# Patient Record
Sex: FEMALE | Race: WHITE | Employment: OTHER | URBAN - METROPOLITAN AREA
[De-identification: names, ages, dates, MRNs, and addresses within clinical notes are randomized per-mention and may not be internally consistent; named-entity substitution may affect disease eponyms.]

---

## 2019-03-13 ENCOUNTER — APPOINTMENT (OUTPATIENT)
Dept: CT IMAGING | Age: 84
End: 2019-03-13
Payer: MEDICARE

## 2019-03-13 ENCOUNTER — HOSPITAL ENCOUNTER (OUTPATIENT)
Age: 84
Setting detail: OBSERVATION
Discharge: HOME HEALTH | End: 2019-03-14
Attending: EMERGENCY MEDICINE | Admitting: INTERNAL MEDICINE
Payer: MEDICARE

## 2019-03-13 DIAGNOSIS — I63.9 CEREBROVASCULAR ACCIDENT (CVA), UNSPECIFIED MECHANISM (HCC): Primary | ICD-10-CM

## 2019-03-13 PROBLEM — G45.9 TIA (TRANSIENT ISCHEMIC ATTACK): Status: ACTIVE | Noted: 2019-03-13

## 2019-03-13 LAB
ANION GAP SERPL CALCULATED.3IONS-SCNC: 11 MMOL/L (ref 3–16)
BASOPHILS ABSOLUTE: 0 K/UL (ref 0–0.2)
BASOPHILS RELATIVE PERCENT: 0.5 %
BUN BLDV-MCNC: 13 MG/DL (ref 7–20)
CALCIUM SERPL-MCNC: 9.1 MG/DL (ref 8.3–10.6)
CHLORIDE BLD-SCNC: 103 MMOL/L (ref 99–110)
CO2: 26 MMOL/L (ref 21–32)
CREAT SERPL-MCNC: 0.7 MG/DL (ref 0.6–1.2)
EOSINOPHILS ABSOLUTE: 0.1 K/UL (ref 0–0.6)
EOSINOPHILS RELATIVE PERCENT: 1.1 %
GFR AFRICAN AMERICAN: >60
GFR NON-AFRICAN AMERICAN: >60
GLUCOSE BLD-MCNC: 106 MG/DL (ref 70–99)
GLUCOSE BLD-MCNC: 113 MG/DL (ref 70–99)
HCT VFR BLD CALC: 39.8 % (ref 36–48)
HEMOGLOBIN: 13.4 G/DL (ref 12–16)
INR BLD: 2.36 (ref 0.86–1.14)
LYMPHOCYTES ABSOLUTE: 1.9 K/UL (ref 1–5.1)
LYMPHOCYTES RELATIVE PERCENT: 35.4 %
MCH RBC QN AUTO: 30.9 PG (ref 26–34)
MCHC RBC AUTO-ENTMCNC: 33.6 G/DL (ref 31–36)
MCV RBC AUTO: 92.1 FL (ref 80–100)
MONOCYTES ABSOLUTE: 0.5 K/UL (ref 0–1.3)
MONOCYTES RELATIVE PERCENT: 9.1 %
NEUTROPHILS ABSOLUTE: 3 K/UL (ref 1.7–7.7)
NEUTROPHILS RELATIVE PERCENT: 53.9 %
PDW BLD-RTO: 14.3 % (ref 12.4–15.4)
PERFORMED ON: ABNORMAL
PLATELET # BLD: 233 K/UL (ref 135–450)
PMV BLD AUTO: 7.1 FL (ref 5–10.5)
POTASSIUM SERPL-SCNC: 3.5 MMOL/L (ref 3.5–5.1)
PROTHROMBIN TIME: 26.9 SEC (ref 9.8–13)
RBC # BLD: 4.32 M/UL (ref 4–5.2)
SODIUM BLD-SCNC: 140 MMOL/L (ref 136–145)
TROPONIN: <0.01 NG/ML
WBC # BLD: 5.5 K/UL (ref 4–11)

## 2019-03-13 PROCEDURE — 6370000000 HC RX 637 (ALT 250 FOR IP): Performed by: INTERNAL MEDICINE

## 2019-03-13 PROCEDURE — 36415 COLL VENOUS BLD VENIPUNCTURE: CPT

## 2019-03-13 PROCEDURE — 99285 EMERGENCY DEPT VISIT HI MDM: CPT

## 2019-03-13 PROCEDURE — 85610 PROTHROMBIN TIME: CPT

## 2019-03-13 PROCEDURE — 84484 ASSAY OF TROPONIN QUANT: CPT

## 2019-03-13 PROCEDURE — 70496 CT ANGIOGRAPHY HEAD: CPT

## 2019-03-13 PROCEDURE — 93005 ELECTROCARDIOGRAM TRACING: CPT | Performed by: EMERGENCY MEDICINE

## 2019-03-13 PROCEDURE — 93010 ELECTROCARDIOGRAM REPORT: CPT | Performed by: INTERNAL MEDICINE

## 2019-03-13 PROCEDURE — 6360000004 HC RX CONTRAST MEDICATION: Performed by: EMERGENCY MEDICINE

## 2019-03-13 PROCEDURE — 85025 COMPLETE CBC W/AUTO DIFF WBC: CPT

## 2019-03-13 PROCEDURE — 2060000000 HC ICU INTERMEDIATE R&B

## 2019-03-13 PROCEDURE — 80048 BASIC METABOLIC PNL TOTAL CA: CPT

## 2019-03-13 PROCEDURE — 70450 CT HEAD/BRAIN W/O DYE: CPT

## 2019-03-13 PROCEDURE — 70498 CT ANGIOGRAPHY NECK: CPT

## 2019-03-13 PROCEDURE — 2580000003 HC RX 258: Performed by: INTERNAL MEDICINE

## 2019-03-13 RX ORDER — MULTIVIT-MIN/IRON/FOLIC ACID/K 18-600-40
2000 CAPSULE ORAL DAILY
COMMUNITY

## 2019-03-13 RX ORDER — LABETALOL HYDROCHLORIDE 5 MG/ML
10 INJECTION, SOLUTION INTRAVENOUS EVERY 10 MIN PRN
Status: DISCONTINUED | OUTPATIENT
Start: 2019-03-13 | End: 2019-03-14 | Stop reason: HOSPADM

## 2019-03-13 RX ORDER — WARFARIN SODIUM 7.5 MG/1
7.5 TABLET ORAL EVERY EVENING
Status: DISCONTINUED | OUTPATIENT
Start: 2019-03-13 | End: 2019-03-13

## 2019-03-13 RX ORDER — HYDROCHLOROTHIAZIDE 25 MG/1
25 TABLET ORAL DAILY
COMMUNITY

## 2019-03-13 RX ORDER — ASPIRIN 81 MG/1
81 TABLET ORAL DAILY
Status: DISCONTINUED | OUTPATIENT
Start: 2019-03-14 | End: 2019-03-14 | Stop reason: HOSPADM

## 2019-03-13 RX ORDER — DILTIAZEM HYDROCHLORIDE 120 MG/1
120 CAPSULE, EXTENDED RELEASE ORAL DAILY
COMMUNITY
Start: 2019-02-13

## 2019-03-13 RX ORDER — DILTIAZEM HYDROCHLORIDE 120 MG/1
120 CAPSULE, COATED, EXTENDED RELEASE ORAL NIGHTLY
Status: DISCONTINUED | OUTPATIENT
Start: 2019-03-14 | End: 2019-03-14 | Stop reason: HOSPADM

## 2019-03-13 RX ORDER — WARFARIN SODIUM 5 MG/1
7.5 TABLET ORAL EVERY EVENING
COMMUNITY
End: 2021-01-01

## 2019-03-13 RX ORDER — WARFARIN SODIUM 7.5 MG/1
7.5 TABLET ORAL
Status: COMPLETED | OUTPATIENT
Start: 2019-03-13 | End: 2019-03-13

## 2019-03-13 RX ORDER — LISINOPRIL 20 MG/1
20 TABLET ORAL DAILY
Status: DISCONTINUED | OUTPATIENT
Start: 2019-03-14 | End: 2019-03-14 | Stop reason: HOSPADM

## 2019-03-13 RX ORDER — DILTIAZEM HYDROCHLORIDE 120 MG/1
120 CAPSULE, COATED, EXTENDED RELEASE ORAL DAILY
Status: DISCONTINUED | OUTPATIENT
Start: 2019-03-14 | End: 2019-03-13

## 2019-03-13 RX ORDER — ACETAMINOPHEN 325 MG/1
650 TABLET ORAL EVERY 4 HOURS PRN
Status: DISCONTINUED | OUTPATIENT
Start: 2019-03-13 | End: 2019-03-14 | Stop reason: HOSPADM

## 2019-03-13 RX ORDER — SODIUM CHLORIDE 0.9 % (FLUSH) 0.9 %
10 SYRINGE (ML) INJECTION EVERY 12 HOURS SCHEDULED
Status: DISCONTINUED | OUTPATIENT
Start: 2019-03-13 | End: 2019-03-14 | Stop reason: HOSPADM

## 2019-03-13 RX ORDER — SODIUM CHLORIDE 0.9 % (FLUSH) 0.9 %
10 SYRINGE (ML) INJECTION PRN
Status: DISCONTINUED | OUTPATIENT
Start: 2019-03-13 | End: 2019-03-14 | Stop reason: HOSPADM

## 2019-03-13 RX ORDER — ONDANSETRON 2 MG/ML
4 INJECTION INTRAMUSCULAR; INTRAVENOUS EVERY 6 HOURS PRN
Status: DISCONTINUED | OUTPATIENT
Start: 2019-03-13 | End: 2019-03-14 | Stop reason: HOSPADM

## 2019-03-13 RX ORDER — SIMVASTATIN 20 MG
20 TABLET ORAL NIGHTLY
Status: DISCONTINUED | OUTPATIENT
Start: 2019-03-13 | End: 2019-03-14 | Stop reason: HOSPADM

## 2019-03-13 RX ORDER — LISINOPRIL 20 MG/1
20 TABLET ORAL DAILY
Status: ON HOLD | COMMUNITY
Start: 2019-02-01 | End: 2019-03-14 | Stop reason: SDUPTHER

## 2019-03-13 RX ADMIN — ACETAMINOPHEN 650 MG: 325 TABLET, FILM COATED ORAL at 23:08

## 2019-03-13 RX ADMIN — WARFARIN SODIUM 7.5 MG: 7.5 TABLET ORAL at 22:53

## 2019-03-13 RX ADMIN — DILTIAZEM HYDROCHLORIDE 120 MG: 120 CAPSULE, COATED, EXTENDED RELEASE ORAL at 23:47

## 2019-03-13 RX ADMIN — IOPAMIDOL 75 ML: 755 INJECTION, SOLUTION INTRAVENOUS at 12:50

## 2019-03-13 RX ADMIN — Medication 10 ML: at 22:55

## 2019-03-13 ASSESSMENT — PAIN DESCRIPTION - PAIN TYPE: TYPE: CHRONIC PAIN

## 2019-03-13 ASSESSMENT — PAIN SCALES - GENERAL
PAINLEVEL_OUTOF10: 3
PAINLEVEL_OUTOF10: 0
PAINLEVEL_OUTOF10: 0

## 2019-03-13 ASSESSMENT — PAIN DESCRIPTION - LOCATION: LOCATION: BACK

## 2019-03-14 ENCOUNTER — APPOINTMENT (OUTPATIENT)
Dept: MRI IMAGING | Age: 84
End: 2019-03-14
Payer: MEDICARE

## 2019-03-14 VITALS
DIASTOLIC BLOOD PRESSURE: 83 MMHG | HEART RATE: 61 BPM | TEMPERATURE: 98 F | HEIGHT: 63 IN | OXYGEN SATURATION: 95 % | WEIGHT: 176.81 LBS | RESPIRATION RATE: 18 BRPM | SYSTOLIC BLOOD PRESSURE: 162 MMHG | BODY MASS INDEX: 31.33 KG/M2

## 2019-03-14 LAB
CHOLESTEROL, TOTAL: 180 MG/DL (ref 0–199)
HDLC SERPL-MCNC: 48 MG/DL (ref 40–60)
INR BLD: 1.98 (ref 0.86–1.14)
LDL CHOLESTEROL CALCULATED: 108 MG/DL
PROTHROMBIN TIME: 22.6 SEC (ref 9.8–13)
TRIGL SERPL-MCNC: 119 MG/DL (ref 0–150)
VLDLC SERPL CALC-MCNC: 24 MG/DL

## 2019-03-14 PROCEDURE — G0378 HOSPITAL OBSERVATION PER HR: HCPCS

## 2019-03-14 PROCEDURE — 85610 PROTHROMBIN TIME: CPT

## 2019-03-14 PROCEDURE — 97535 SELF CARE MNGMENT TRAINING: CPT

## 2019-03-14 PROCEDURE — 2580000003 HC RX 258: Performed by: INTERNAL MEDICINE

## 2019-03-14 PROCEDURE — 80061 LIPID PANEL: CPT

## 2019-03-14 PROCEDURE — 6370000000 HC RX 637 (ALT 250 FOR IP): Performed by: INTERNAL MEDICINE

## 2019-03-14 PROCEDURE — 97530 THERAPEUTIC ACTIVITIES: CPT

## 2019-03-14 PROCEDURE — 36415 COLL VENOUS BLD VENIPUNCTURE: CPT

## 2019-03-14 PROCEDURE — 97161 PT EVAL LOW COMPLEX 20 MIN: CPT

## 2019-03-14 PROCEDURE — 97166 OT EVAL MOD COMPLEX 45 MIN: CPT

## 2019-03-14 PROCEDURE — 70551 MRI BRAIN STEM W/O DYE: CPT

## 2019-03-14 PROCEDURE — 94760 N-INVAS EAR/PLS OXIMETRY 1: CPT

## 2019-03-14 RX ORDER — LISINOPRIL 20 MG/1
40 TABLET ORAL DAILY
Qty: 60 TABLET | Refills: 3 | Status: SHIPPED | OUTPATIENT
Start: 2019-03-14

## 2019-03-14 RX ORDER — SIMVASTATIN 20 MG
20 TABLET ORAL NIGHTLY
Qty: 30 TABLET | Refills: 3 | Status: SHIPPED | OUTPATIENT
Start: 2019-03-14 | End: 2021-01-01

## 2019-03-14 RX ORDER — WARFARIN SODIUM 7.5 MG/1
7.5 TABLET ORAL
Status: DISCONTINUED | OUTPATIENT
Start: 2019-03-14 | End: 2019-03-14 | Stop reason: HOSPADM

## 2019-03-14 RX ADMIN — LISINOPRIL 20 MG: 20 TABLET ORAL at 09:01

## 2019-03-14 RX ADMIN — Medication 10 ML: at 09:01

## 2019-03-14 RX ADMIN — ASPIRIN 81 MG: 81 TABLET, COATED ORAL at 09:01

## 2019-03-14 ASSESSMENT — PAIN SCALES - GENERAL
PAINLEVEL_OUTOF10: 0
PAINLEVEL_OUTOF10: 0

## 2019-03-15 LAB
EKG ATRIAL RATE: 71 BPM
EKG DIAGNOSIS: NORMAL
EKG P AXIS: 41 DEGREES
EKG P-R INTERVAL: 190 MS
EKG Q-T INTERVAL: 396 MS
EKG QRS DURATION: 82 MS
EKG QTC CALCULATION (BAZETT): 430 MS
EKG R AXIS: 6 DEGREES
EKG T AXIS: 78 DEGREES
EKG VENTRICULAR RATE: 71 BPM

## 2021-01-01 ENCOUNTER — APPOINTMENT (OUTPATIENT)
Dept: GENERAL RADIOLOGY | Age: 86
DRG: 853 | End: 2021-01-01
Payer: MEDICARE

## 2021-01-01 ENCOUNTER — HOSPITAL ENCOUNTER (INPATIENT)
Age: 86
LOS: 5 days | DRG: 853 | End: 2021-08-16
Attending: EMERGENCY MEDICINE | Admitting: INTERNAL MEDICINE
Payer: MEDICARE

## 2021-01-01 ENCOUNTER — APPOINTMENT (OUTPATIENT)
Dept: CT IMAGING | Age: 86
DRG: 853 | End: 2021-01-01
Payer: MEDICARE

## 2021-01-01 VITALS
SYSTOLIC BLOOD PRESSURE: 129 MMHG | RESPIRATION RATE: 34 BRPM | HEART RATE: 103 BPM | HEIGHT: 63 IN | TEMPERATURE: 98.8 F | BODY MASS INDEX: 29.77 KG/M2 | OXYGEN SATURATION: 85 % | DIASTOLIC BLOOD PRESSURE: 48 MMHG | WEIGHT: 167.99 LBS

## 2021-01-01 DIAGNOSIS — J18.9 COMMUNITY ACQUIRED BILATERAL LOWER LOBE PNEUMONIA: ICD-10-CM

## 2021-01-01 DIAGNOSIS — J96.01 ACUTE RESPIRATORY FAILURE WITH HYPOXIA (HCC): ICD-10-CM

## 2021-01-01 DIAGNOSIS — J93.0 TENSION PNEUMOTHORAX: Primary | ICD-10-CM

## 2021-01-01 DIAGNOSIS — J15.9 COMMUNITY ACQUIRED BACTERIAL PNEUMONIA: ICD-10-CM

## 2021-01-01 DIAGNOSIS — R57.9 SHOCK (HCC): ICD-10-CM

## 2021-01-01 DIAGNOSIS — N17.9 AKI (ACUTE KIDNEY INJURY) (HCC): ICD-10-CM

## 2021-01-01 LAB
(1,3)-BETA-D-GLUCAN (FUNGITELL) INTERPRETATION: POSITIVE
(1,3)-BETA-D-GLUCAN (FUNGITELL): 293 PG/ML
A/G RATIO: 0.7 (ref 1.1–2.2)
ABO/RH: NORMAL
ALBUMIN SERPL-MCNC: 2.6 G/DL (ref 3.4–5)
ALP BLD-CCNC: 106 U/L (ref 40–129)
ALT SERPL-CCNC: 14 U/L (ref 10–40)
ANION GAP SERPL CALCULATED.3IONS-SCNC: 10 MMOL/L (ref 3–16)
ANION GAP SERPL CALCULATED.3IONS-SCNC: 11 MMOL/L (ref 3–16)
ANION GAP SERPL CALCULATED.3IONS-SCNC: 11 MMOL/L (ref 3–16)
ANION GAP SERPL CALCULATED.3IONS-SCNC: 13 MMOL/L (ref 3–16)
ANION GAP SERPL CALCULATED.3IONS-SCNC: 15 MMOL/L (ref 3–16)
ANION GAP SERPL CALCULATED.3IONS-SCNC: 16 MMOL/L (ref 3–16)
ANION GAP SERPL CALCULATED.3IONS-SCNC: 9 MMOL/L (ref 3–16)
ANTI-NUCLEAR ANTIBODY (ANA): NEGATIVE
ANTIBODY SCREEN: NORMAL
APTT: 28 SEC (ref 26.2–38.6)
AST SERPL-CCNC: 20 U/L (ref 15–37)
BASE EXCESS ARTERIAL: 1.3 MMOL/L (ref -3–3)
BASE EXCESS ARTERIAL: 4.9 MMOL/L (ref -3–3)
BASE EXCESS ARTERIAL: 5.7 MMOL/L (ref -3–3)
BASOPHILS ABSOLUTE: 0 K/UL (ref 0–0.2)
BASOPHILS RELATIVE PERCENT: 0.1 %
BASOPHILS RELATIVE PERCENT: 0.2 %
BILIRUB SERPL-MCNC: 0.5 MG/DL (ref 0–1)
BLOOD BANK DISPENSE STATUS: NORMAL
BLOOD BANK PRODUCT CODE: NORMAL
BLOOD CULTURE, ROUTINE: NORMAL
BPU ID: NORMAL
BUN BLDV-MCNC: 13 MG/DL (ref 7–20)
BUN BLDV-MCNC: 17 MG/DL (ref 7–20)
BUN BLDV-MCNC: 18 MG/DL (ref 7–20)
BUN BLDV-MCNC: 23 MG/DL (ref 7–20)
BUN BLDV-MCNC: 28 MG/DL (ref 7–20)
BUN BLDV-MCNC: 43 MG/DL (ref 7–20)
BUN BLDV-MCNC: 53 MG/DL (ref 7–20)
C-REACTIVE PROTEIN: 213.9 MG/L (ref 0–5.1)
C-REACTIVE PROTEIN: 435 MG/L (ref 0–5.1)
CALCIUM SERPL-MCNC: 8.5 MG/DL (ref 8.3–10.6)
CALCIUM SERPL-MCNC: 8.7 MG/DL (ref 8.3–10.6)
CALCIUM SERPL-MCNC: 8.7 MG/DL (ref 8.3–10.6)
CALCIUM SERPL-MCNC: 8.9 MG/DL (ref 8.3–10.6)
CALCIUM SERPL-MCNC: 8.9 MG/DL (ref 8.3–10.6)
CALCIUM SERPL-MCNC: 9 MG/DL (ref 8.3–10.6)
CALCIUM SERPL-MCNC: 9 MG/DL (ref 8.3–10.6)
CARBOXYHEMOGLOBIN ARTERIAL: 1.2 % (ref 0–1.5)
CARBOXYHEMOGLOBIN ARTERIAL: 1.3 % (ref 0–1.5)
CARBOXYHEMOGLOBIN ARTERIAL: <1 % (ref 0–1.5)
CHLORIDE BLD-SCNC: 100 MMOL/L (ref 99–110)
CHLORIDE BLD-SCNC: 92 MMOL/L (ref 99–110)
CHLORIDE BLD-SCNC: 95 MMOL/L (ref 99–110)
CHLORIDE BLD-SCNC: 96 MMOL/L (ref 99–110)
CHLORIDE BLD-SCNC: 96 MMOL/L (ref 99–110)
CHLORIDE BLD-SCNC: 97 MMOL/L (ref 99–110)
CHLORIDE BLD-SCNC: 97 MMOL/L (ref 99–110)
CO2: 24 MMOL/L (ref 21–32)
CO2: 24 MMOL/L (ref 21–32)
CO2: 25 MMOL/L (ref 21–32)
CO2: 28 MMOL/L (ref 21–32)
CO2: 29 MMOL/L (ref 21–32)
CREAT SERPL-MCNC: 1 MG/DL (ref 0.6–1.2)
CREAT SERPL-MCNC: 1.1 MG/DL (ref 0.6–1.2)
CREAT SERPL-MCNC: 1.2 MG/DL (ref 0.6–1.2)
CREAT SERPL-MCNC: 1.3 MG/DL (ref 0.6–1.2)
CREAT SERPL-MCNC: 1.3 MG/DL (ref 0.6–1.2)
CREAT SERPL-MCNC: 1.6 MG/DL (ref 0.6–1.2)
CREAT SERPL-MCNC: 1.7 MG/DL (ref 0.6–1.2)
CULTURE, BLOOD 2: NORMAL
CYCLIC CITRULLINATED PEPTIDE ANTIBODY IGG: <0.5 U/ML (ref 0–2.9)
DESCRIPTION BLOOD BANK: NORMAL
EKG ATRIAL RATE: 80 BPM
EKG ATRIAL RATE: 90 BPM
EKG DIAGNOSIS: NORMAL
EKG DIAGNOSIS: NORMAL
EKG P AXIS: 40 DEGREES
EKG P-R INTERVAL: 194 MS
EKG Q-T INTERVAL: 330 MS
EKG Q-T INTERVAL: 390 MS
EKG QRS DURATION: 102 MS
EKG QRS DURATION: 88 MS
EKG QTC CALCULATION (BAZETT): 394 MS
EKG QTC CALCULATION (BAZETT): 449 MS
EKG R AXIS: 2 DEGREES
EKG R AXIS: 7 DEGREES
EKG T AXIS: 73 DEGREES
EKG T AXIS: 87 DEGREES
EKG VENTRICULAR RATE: 80 BPM
EKG VENTRICULAR RATE: 86 BPM
EOSINOPHILS ABSOLUTE: 0 K/UL (ref 0–0.6)
EOSINOPHILS RELATIVE PERCENT: 0 %
EOSINOPHILS RELATIVE PERCENT: 0.1 %
EOSINOPHILS RELATIVE PERCENT: 0.2 %
EOSINOPHILS RELATIVE PERCENT: 0.3 %
GFR AFRICAN AMERICAN: 34
GFR AFRICAN AMERICAN: 37
GFR AFRICAN AMERICAN: 47
GFR AFRICAN AMERICAN: 47
GFR AFRICAN AMERICAN: 51
GFR AFRICAN AMERICAN: 57
GFR AFRICAN AMERICAN: >60
GFR NON-AFRICAN AMERICAN: 28
GFR NON-AFRICAN AMERICAN: 30
GFR NON-AFRICAN AMERICAN: 39
GFR NON-AFRICAN AMERICAN: 39
GFR NON-AFRICAN AMERICAN: 42
GFR NON-AFRICAN AMERICAN: 47
GFR NON-AFRICAN AMERICAN: 52
GLOBULIN: 3.8 G/DL
GLUCOSE BLD-MCNC: 119 MG/DL (ref 70–99)
GLUCOSE BLD-MCNC: 125 MG/DL (ref 70–99)
GLUCOSE BLD-MCNC: 151 MG/DL (ref 70–99)
GLUCOSE BLD-MCNC: 156 MG/DL (ref 70–99)
GLUCOSE BLD-MCNC: 157 MG/DL (ref 70–99)
GLUCOSE BLD-MCNC: 180 MG/DL (ref 70–99)
GLUCOSE BLD-MCNC: 286 MG/DL (ref 70–99)
HCO3 ARTERIAL: 27 MMOL/L (ref 21–29)
HCO3 ARTERIAL: 29.5 MMOL/L (ref 21–29)
HCO3 ARTERIAL: 29.8 MMOL/L (ref 21–29)
HCT VFR BLD CALC: 29.5 % (ref 36–48)
HCT VFR BLD CALC: 34.6 % (ref 36–48)
HCT VFR BLD CALC: 34.8 % (ref 36–48)
HCT VFR BLD CALC: 35.1 % (ref 36–48)
HCT VFR BLD CALC: 35.4 % (ref 36–48)
HCT VFR BLD CALC: 36.2 % (ref 36–48)
HCT VFR BLD CALC: 36.5 % (ref 36–48)
HEMOGLOBIN, ART, EXTENDED: 10.5 G/DL (ref 12–16)
HEMOGLOBIN, ART, EXTENDED: 10.8 G/DL (ref 12–16)
HEMOGLOBIN, ART, EXTENDED: 12.1 G/DL (ref 12–16)
HEMOGLOBIN: 11.7 G/DL (ref 12–16)
HEMOGLOBIN: 11.8 G/DL (ref 12–16)
HEMOGLOBIN: 11.9 G/DL (ref 12–16)
HEMOGLOBIN: 12 G/DL (ref 12–16)
HEMOGLOBIN: 12.2 G/DL (ref 12–16)
HEMOGLOBIN: 12.4 G/DL (ref 12–16)
HEMOGLOBIN: 9.9 G/DL (ref 12–16)
INR BLD: 1.93 (ref 0.88–1.12)
INR BLD: 2.12 (ref 0.88–1.12)
INR BLD: 2.15 (ref 0.88–1.12)
INR BLD: 2.18 (ref 0.88–1.12)
INR BLD: 2.28 (ref 0.88–1.12)
L. PNEUMOPHILA SEROGP 1 UR AG: NORMAL
LACTIC ACID, SEPSIS: 1 MMOL/L (ref 0.4–1.9)
LACTIC ACID, SEPSIS: 1.2 MMOL/L (ref 0.4–1.9)
LACTIC ACID, SEPSIS: 1.6 MMOL/L (ref 0.4–1.9)
LACTIC ACID, SEPSIS: 2.8 MMOL/L (ref 0.4–1.9)
LACTIC ACID: 1.9 MMOL/L (ref 0.4–2)
LV EF: 63 %
LVEF MODALITY: NORMAL
LYMPHOCYTES ABSOLUTE: 0.2 K/UL (ref 1–5.1)
LYMPHOCYTES ABSOLUTE: 0.5 K/UL (ref 1–5.1)
LYMPHOCYTES ABSOLUTE: 0.5 K/UL (ref 1–5.1)
LYMPHOCYTES ABSOLUTE: 0.6 K/UL (ref 1–5.1)
LYMPHOCYTES ABSOLUTE: 0.7 K/UL (ref 1–5.1)
LYMPHOCYTES ABSOLUTE: 0.9 K/UL (ref 1–5.1)
LYMPHOCYTES ABSOLUTE: 1.4 K/UL (ref 1–5.1)
LYMPHOCYTES RELATIVE PERCENT: 1 %
LYMPHOCYTES RELATIVE PERCENT: 11.1 %
LYMPHOCYTES RELATIVE PERCENT: 2.9 %
LYMPHOCYTES RELATIVE PERCENT: 3.1 %
LYMPHOCYTES RELATIVE PERCENT: 3.3 %
LYMPHOCYTES RELATIVE PERCENT: 4.1 %
LYMPHOCYTES RELATIVE PERCENT: 6.7 %
MAGNESIUM: 2 MG/DL (ref 1.8–2.4)
MAGNESIUM: 2 MG/DL (ref 1.8–2.4)
MAGNESIUM: 2.5 MG/DL (ref 1.8–2.4)
MCH RBC QN AUTO: 30.5 PG (ref 26–34)
MCH RBC QN AUTO: 31 PG (ref 26–34)
MCH RBC QN AUTO: 31.1 PG (ref 26–34)
MCH RBC QN AUTO: 31.3 PG (ref 26–34)
MCH RBC QN AUTO: 31.5 PG (ref 26–34)
MCH RBC QN AUTO: 31.8 PG (ref 26–34)
MCH RBC QN AUTO: 32 PG (ref 26–34)
MCHC RBC AUTO-ENTMCNC: 33.2 G/DL (ref 31–36)
MCHC RBC AUTO-ENTMCNC: 33.6 G/DL (ref 31–36)
MCHC RBC AUTO-ENTMCNC: 33.7 G/DL (ref 31–36)
MCHC RBC AUTO-ENTMCNC: 33.9 G/DL (ref 31–36)
MCHC RBC AUTO-ENTMCNC: 33.9 G/DL (ref 31–36)
MCHC RBC AUTO-ENTMCNC: 34 G/DL (ref 31–36)
MCHC RBC AUTO-ENTMCNC: 34.2 G/DL (ref 31–36)
MCV RBC AUTO: 91.8 FL (ref 80–100)
MCV RBC AUTO: 91.8 FL (ref 80–100)
MCV RBC AUTO: 92.1 FL (ref 80–100)
MCV RBC AUTO: 92.2 FL (ref 80–100)
MCV RBC AUTO: 92.9 FL (ref 80–100)
MCV RBC AUTO: 93.5 FL (ref 80–100)
MCV RBC AUTO: 94.3 FL (ref 80–100)
METHEMOGLOBIN ARTERIAL: 0.2 %
METHEMOGLOBIN ARTERIAL: 0.3 %
METHEMOGLOBIN ARTERIAL: 0.4 %
MONOCYTES ABSOLUTE: 0.5 K/UL (ref 0–1.3)
MONOCYTES ABSOLUTE: 0.7 K/UL (ref 0–1.3)
MONOCYTES ABSOLUTE: 0.8 K/UL (ref 0–1.3)
MONOCYTES ABSOLUTE: 1 K/UL (ref 0–1.3)
MONOCYTES ABSOLUTE: 1.1 K/UL (ref 0–1.3)
MONOCYTES ABSOLUTE: 1.2 K/UL (ref 0–1.3)
MONOCYTES ABSOLUTE: 1.4 K/UL (ref 0–1.3)
MONOCYTES RELATIVE PERCENT: 3.3 %
MONOCYTES RELATIVE PERCENT: 3.6 %
MONOCYTES RELATIVE PERCENT: 3.9 %
MONOCYTES RELATIVE PERCENT: 6.3 %
MONOCYTES RELATIVE PERCENT: 7.7 %
MONOCYTES RELATIVE PERCENT: 7.8 %
MONOCYTES RELATIVE PERCENT: 9.7 %
MRSA SCREEN RT-PCR: NORMAL
NEUTROPHILS ABSOLUTE: 10.2 K/UL (ref 1.7–7.7)
NEUTROPHILS ABSOLUTE: 11.3 K/UL (ref 1.7–7.7)
NEUTROPHILS ABSOLUTE: 13.6 K/UL (ref 1.7–7.7)
NEUTROPHILS ABSOLUTE: 15.2 K/UL (ref 1.7–7.7)
NEUTROPHILS ABSOLUTE: 16.1 K/UL (ref 1.7–7.7)
NEUTROPHILS ABSOLUTE: 17.5 K/UL (ref 1.7–7.7)
NEUTROPHILS ABSOLUTE: 22.1 K/UL (ref 1.7–7.7)
NEUTROPHILS RELATIVE PERCENT: 78.7 %
NEUTROPHILS RELATIVE PERCENT: 85.1 %
NEUTROPHILS RELATIVE PERCENT: 89 %
NEUTROPHILS RELATIVE PERCENT: 89.4 %
NEUTROPHILS RELATIVE PERCENT: 93 %
NEUTROPHILS RELATIVE PERCENT: 93.1 %
NEUTROPHILS RELATIVE PERCENT: 95.6 %
O2 SAT, ARTERIAL: 67.6 %
O2 SAT, ARTERIAL: 82.8 %
O2 SAT, ARTERIAL: 90 %
O2 THERAPY: ABNORMAL
PCO2 ARTERIAL: 39.2 MMHG (ref 35–45)
PCO2 ARTERIAL: 44.8 MMHG (ref 35–45)
PCO2 ARTERIAL: 46.6 MMHG (ref 35–45)
PDW BLD-RTO: 14.1 % (ref 12.4–15.4)
PDW BLD-RTO: 14.2 % (ref 12.4–15.4)
PDW BLD-RTO: 14.2 % (ref 12.4–15.4)
PDW BLD-RTO: 14.4 % (ref 12.4–15.4)
PDW BLD-RTO: 14.5 % (ref 12.4–15.4)
PH ARTERIAL: 7.37 (ref 7.35–7.45)
PH ARTERIAL: 7.43 (ref 7.35–7.45)
PH ARTERIAL: 7.49 (ref 7.35–7.45)
PLATELET # BLD: 233 K/UL (ref 135–450)
PLATELET # BLD: 288 K/UL (ref 135–450)
PLATELET # BLD: 303 K/UL (ref 135–450)
PLATELET # BLD: 346 K/UL (ref 135–450)
PLATELET # BLD: 351 K/UL (ref 135–450)
PLATELET # BLD: 362 K/UL (ref 135–450)
PLATELET # BLD: 367 K/UL (ref 135–450)
PMV BLD AUTO: 7 FL (ref 5–10.5)
PMV BLD AUTO: 7 FL (ref 5–10.5)
PMV BLD AUTO: 7.2 FL (ref 5–10.5)
PMV BLD AUTO: 7.2 FL (ref 5–10.5)
PMV BLD AUTO: 7.4 FL (ref 5–10.5)
PO2 ARTERIAL: 38.3 MMHG (ref 75–108)
PO2 ARTERIAL: 47.2 MMHG (ref 75–108)
PO2 ARTERIAL: 56.9 MMHG (ref 75–108)
POTASSIUM REFLEX MAGNESIUM: 3.2 MMOL/L (ref 3.5–5.1)
POTASSIUM REFLEX MAGNESIUM: 3.3 MMOL/L (ref 3.5–5.1)
POTASSIUM REFLEX MAGNESIUM: 3.5 MMOL/L (ref 3.5–5.1)
POTASSIUM REFLEX MAGNESIUM: 3.6 MMOL/L (ref 3.5–5.1)
PRO-BNP: 1955 PG/ML (ref 0–449)
PRO-BNP: 961 PG/ML (ref 0–449)
PROCALCITONIN: 0.21 NG/ML (ref 0–0.15)
PROCALCITONIN: 0.47 NG/ML (ref 0–0.15)
PROCALCITONIN: 0.69 NG/ML (ref 0–0.15)
PROCALCITONIN: 0.7 NG/ML (ref 0–0.15)
PROTHROMBIN TIME: 22.4 SEC (ref 9.9–12.7)
PROTHROMBIN TIME: 24.7 SEC (ref 9.9–12.7)
PROTHROMBIN TIME: 25.1 SEC (ref 9.9–12.7)
PROTHROMBIN TIME: 25.5 SEC (ref 9.9–12.7)
PROTHROMBIN TIME: 26.7 SEC (ref 9.9–12.7)
RBC # BLD: 3.2 M/UL (ref 4–5.2)
RBC # BLD: 3.77 M/UL (ref 4–5.2)
RBC # BLD: 3.78 M/UL (ref 4–5.2)
RBC # BLD: 3.78 M/UL (ref 4–5.2)
RBC # BLD: 3.86 M/UL (ref 4–5.2)
RBC # BLD: 3.87 M/UL (ref 4–5.2)
RBC # BLD: 3.87 M/UL (ref 4–5.2)
RHEUMATOID FACTOR: 20 IU/ML
SARS-COV-2, NAAT: NOT DETECTED
SARS-COV-2: NOT DETECTED
SEDIMENTATION RATE, ERYTHROCYTE: 65 MM/HR (ref 0–30)
SODIUM BLD-SCNC: 131 MMOL/L (ref 136–145)
SODIUM BLD-SCNC: 133 MMOL/L (ref 136–145)
SODIUM BLD-SCNC: 135 MMOL/L (ref 136–145)
SODIUM BLD-SCNC: 135 MMOL/L (ref 136–145)
SODIUM BLD-SCNC: 136 MMOL/L (ref 136–145)
SODIUM BLD-SCNC: 137 MMOL/L (ref 136–145)
SODIUM BLD-SCNC: 137 MMOL/L (ref 136–145)
STREP PNEUMONIAE ANTIGEN, URINE: NORMAL
TCO2 ARTERIAL: 28.4 MMOL/L
TCO2 ARTERIAL: 30.7 MMOL/L
TCO2 ARTERIAL: 31.2 MMOL/L
TOTAL CK: 19 U/L (ref 26–192)
TOTAL PROTEIN: 6.4 G/DL (ref 6.4–8.2)
TROPONIN: 0.01 NG/ML
TROPONIN: 0.01 NG/ML
TROPONIN: 0.06 NG/ML
TROPONIN: 8.48 NG/ML
WBC # BLD: 12.9 K/UL (ref 4–11)
WBC # BLD: 13.2 K/UL (ref 4–11)
WBC # BLD: 14.6 K/UL (ref 4–11)
WBC # BLD: 17 K/UL (ref 4–11)
WBC # BLD: 18.1 K/UL (ref 4–11)
WBC # BLD: 18.8 K/UL (ref 4–11)
WBC # BLD: 23.1 K/UL (ref 4–11)

## 2021-01-01 PROCEDURE — C1874 STENT, COATED/COV W/DEL SYS: HCPCS

## 2021-01-01 PROCEDURE — 6360000002 HC RX W HCPCS

## 2021-01-01 PROCEDURE — 2580000003 HC RX 258: Performed by: NURSE PRACTITIONER

## 2021-01-01 PROCEDURE — 93458 L HRT ARTERY/VENTRICLE ANGIO: CPT | Performed by: INTERNAL MEDICINE

## 2021-01-01 PROCEDURE — C1725 CATH, TRANSLUMIN NON-LASER: HCPCS

## 2021-01-01 PROCEDURE — 6370000000 HC RX 637 (ALT 250 FOR IP)

## 2021-01-01 PROCEDURE — 71045 X-RAY EXAM CHEST 1 VIEW: CPT

## 2021-01-01 PROCEDURE — 82803 BLOOD GASES ANY COMBINATION: CPT

## 2021-01-01 PROCEDURE — 6360000002 HC RX W HCPCS: Performed by: NURSE PRACTITIONER

## 2021-01-01 PROCEDURE — 6360000002 HC RX W HCPCS: Performed by: FAMILY MEDICINE

## 2021-01-01 PROCEDURE — 93005 ELECTROCARDIOGRAM TRACING: CPT | Performed by: HOSPITALIST

## 2021-01-01 PROCEDURE — 94660 CPAP INITIATION&MGMT: CPT

## 2021-01-01 PROCEDURE — 2580000003 HC RX 258: Performed by: FAMILY MEDICINE

## 2021-01-01 PROCEDURE — 2580000003 HC RX 258: Performed by: EMERGENCY MEDICINE

## 2021-01-01 PROCEDURE — 6360000002 HC RX W HCPCS: Performed by: EMERGENCY MEDICINE

## 2021-01-01 PROCEDURE — 32551 INSERTION OF CHEST TUBE: CPT

## 2021-01-01 PROCEDURE — 83605 ASSAY OF LACTIC ACID: CPT

## 2021-01-01 PROCEDURE — U0005 INFEC AGEN DETEC AMPLI PROBE: HCPCS

## 2021-01-01 PROCEDURE — 2700000000 HC OXYGEN THERAPY PER DAY

## 2021-01-01 PROCEDURE — 87040 BLOOD CULTURE FOR BACTERIA: CPT

## 2021-01-01 PROCEDURE — 93458 L HRT ARTERY/VENTRICLE ANGIO: CPT

## 2021-01-01 PROCEDURE — 0BH17EZ INSERTION OF ENDOTRACHEAL AIRWAY INTO TRACHEA, VIA NATURAL OR ARTIFICIAL OPENING: ICD-10-PCS | Performed by: HOSPITALIST

## 2021-01-01 PROCEDURE — 97530 THERAPEUTIC ACTIVITIES: CPT

## 2021-01-01 PROCEDURE — 2500000003 HC RX 250 WO HCPCS

## 2021-01-01 PROCEDURE — 85025 COMPLETE CBC W/AUTO DIFF WBC: CPT

## 2021-01-01 PROCEDURE — C1894 INTRO/SHEATH, NON-LASER: HCPCS

## 2021-01-01 PROCEDURE — 84484 ASSAY OF TROPONIN QUANT: CPT

## 2021-01-01 PROCEDURE — 82550 ASSAY OF CK (CPK): CPT

## 2021-01-01 PROCEDURE — C1769 GUIDE WIRE: HCPCS

## 2021-01-01 PROCEDURE — 94761 N-INVAS EAR/PLS OXIMETRY MLT: CPT

## 2021-01-01 PROCEDURE — 84145 PROCALCITONIN (PCT): CPT

## 2021-01-01 PROCEDURE — 86900 BLOOD TYPING SEROLOGIC ABO: CPT

## 2021-01-01 PROCEDURE — 51702 INSERT TEMP BLADDER CATH: CPT

## 2021-01-01 PROCEDURE — 86255 FLUORESCENT ANTIBODY SCREEN: CPT

## 2021-01-01 PROCEDURE — 2500000003 HC RX 250 WO HCPCS: Performed by: HOSPITALIST

## 2021-01-01 PROCEDURE — 2060000000 HC ICU INTERMEDIATE R&B

## 2021-01-01 PROCEDURE — 86331 IMMUNODIFFUSION OUCHTERLONY: CPT

## 2021-01-01 PROCEDURE — 2709999900 HC NON-CHARGEABLE SUPPLY

## 2021-01-01 PROCEDURE — 71250 CT THORAX DX C-: CPT

## 2021-01-01 PROCEDURE — 85610 PROTHROMBIN TIME: CPT

## 2021-01-01 PROCEDURE — 80048 BASIC METABOLIC PNL TOTAL CA: CPT

## 2021-01-01 PROCEDURE — 83735 ASSAY OF MAGNESIUM: CPT

## 2021-01-01 PROCEDURE — 99292 CRITICAL CARE ADDL 30 MIN: CPT | Performed by: INTERNAL MEDICINE

## 2021-01-01 PROCEDURE — 93005 ELECTROCARDIOGRAM TRACING: CPT | Performed by: EMERGENCY MEDICINE

## 2021-01-01 PROCEDURE — 93306 TTE W/DOPPLER COMPLETE: CPT

## 2021-01-01 PROCEDURE — 86850 RBC ANTIBODY SCREEN: CPT

## 2021-01-01 PROCEDURE — 86698 HISTOPLASMA ANTIBODY: CPT

## 2021-01-01 PROCEDURE — 31622 DX BRONCHOSCOPE/WASH: CPT | Performed by: INTERNAL MEDICINE

## 2021-01-01 PROCEDURE — 99291 CRITICAL CARE FIRST HOUR: CPT | Performed by: INTERNAL MEDICINE

## 2021-01-01 PROCEDURE — C1887 CATHETER, GUIDING: HCPCS

## 2021-01-01 PROCEDURE — B2151ZZ FLUOROSCOPY OF LEFT HEART USING LOW OSMOLAR CONTRAST: ICD-10-PCS | Performed by: INTERNAL MEDICINE

## 2021-01-01 PROCEDURE — 36600 WITHDRAWAL OF ARTERIAL BLOOD: CPT

## 2021-01-01 PROCEDURE — 0W9930Z DRAINAGE OF RIGHT PLEURAL CAVITY WITH DRAINAGE DEVICE, PERCUTANEOUS APPROACH: ICD-10-PCS | Performed by: INTERNAL MEDICINE

## 2021-01-01 PROCEDURE — 97166 OT EVAL MOD COMPLEX 45 MIN: CPT

## 2021-01-01 PROCEDURE — 36415 COLL VENOUS BLD VENIPUNCTURE: CPT

## 2021-01-01 PROCEDURE — 6370000000 HC RX 637 (ALT 250 FOR IP): Performed by: NURSE PRACTITIONER

## 2021-01-01 PROCEDURE — 83880 ASSAY OF NATRIURETIC PEPTIDE: CPT

## 2021-01-01 PROCEDURE — 94002 VENT MGMT INPAT INIT DAY: CPT

## 2021-01-01 PROCEDURE — U0003 INFECTIOUS AGENT DETECTION BY NUCLEIC ACID (DNA OR RNA); SEVERE ACUTE RESPIRATORY SYNDROME CORONAVIRUS 2 (SARS-COV-2) (CORONAVIRUS DISEASE [COVID-19]), AMPLIFIED PROBE TECHNIQUE, MAKING USE OF HIGH THROUGHPUT TECHNOLOGIES AS DESCRIBED BY CMS-2020-01-R: HCPCS

## 2021-01-01 PROCEDURE — 6360000002 HC RX W HCPCS: Performed by: HOSPITALIST

## 2021-01-01 PROCEDURE — 6360000002 HC RX W HCPCS: Performed by: INTERNAL MEDICINE

## 2021-01-01 PROCEDURE — 2580000003 HC RX 258: Performed by: INTERNAL MEDICINE

## 2021-01-01 PROCEDURE — 6360000004 HC RX CONTRAST MEDICATION: Performed by: FAMILY MEDICINE

## 2021-01-01 PROCEDURE — 87798 DETECT AGENT NOS DNA AMP: CPT

## 2021-01-01 PROCEDURE — 4A023N7 MEASUREMENT OF CARDIAC SAMPLING AND PRESSURE, LEFT HEART, PERCUTANEOUS APPROACH: ICD-10-PCS | Performed by: INTERNAL MEDICINE

## 2021-01-01 PROCEDURE — 86635 COCCIDIOIDES ANTIBODY: CPT

## 2021-01-01 PROCEDURE — 85730 THROMBOPLASTIN TIME PARTIAL: CPT

## 2021-01-01 PROCEDURE — 99223 1ST HOSP IP/OBS HIGH 75: CPT | Performed by: INTERNAL MEDICINE

## 2021-01-01 PROCEDURE — 2000000000 HC ICU R&B

## 2021-01-01 PROCEDURE — 87641 MR-STAPH DNA AMP PROBE: CPT

## 2021-01-01 PROCEDURE — 71046 X-RAY EXAM CHEST 2 VIEWS: CPT

## 2021-01-01 PROCEDURE — C9606 PERC D-E COR REVASC W AMI S: HCPCS

## 2021-01-01 PROCEDURE — B2111ZZ FLUOROSCOPY OF MULTIPLE CORONARY ARTERIES USING LOW OSMOLAR CONTRAST: ICD-10-PCS | Performed by: INTERNAL MEDICINE

## 2021-01-01 PROCEDURE — 86606 ASPERGILLUS ANTIBODY: CPT

## 2021-01-01 PROCEDURE — 94640 AIRWAY INHALATION TREATMENT: CPT

## 2021-01-01 PROCEDURE — 6370000000 HC RX 637 (ALT 250 FOR IP): Performed by: HOSPITALIST

## 2021-01-01 PROCEDURE — 97162 PT EVAL MOD COMPLEX 30 MIN: CPT

## 2021-01-01 PROCEDURE — 87449 NOS EACH ORGANISM AG IA: CPT

## 2021-01-01 PROCEDURE — 6370000000 HC RX 637 (ALT 250 FOR IP): Performed by: FAMILY MEDICINE

## 2021-01-01 PROCEDURE — 85652 RBC SED RATE AUTOMATED: CPT

## 2021-01-01 PROCEDURE — 87305 ASPERGILLUS AG IA: CPT

## 2021-01-01 PROCEDURE — 87635 SARS-COV-2 COVID-19 AMP PRB: CPT

## 2021-01-01 PROCEDURE — 86901 BLOOD TYPING SEROLOGIC RH(D): CPT

## 2021-01-01 PROCEDURE — 86005 ALLG SPEC IGE MULTIALLG SCR: CPT

## 2021-01-01 PROCEDURE — 80053 COMPREHEN METABOLIC PANEL: CPT

## 2021-01-01 PROCEDURE — 97535 SELF CARE MNGMENT TRAINING: CPT

## 2021-01-01 PROCEDURE — 5A1935Z RESPIRATORY VENTILATION, LESS THAN 24 CONSECUTIVE HOURS: ICD-10-PCS | Performed by: INTERNAL MEDICINE

## 2021-01-01 PROCEDURE — 93010 ELECTROCARDIOGRAM REPORT: CPT | Performed by: INTERNAL MEDICINE

## 2021-01-01 PROCEDURE — 86612 BLASTOMYCES ANTIBODY: CPT

## 2021-01-01 PROCEDURE — 1200000000 HC SEMI PRIVATE

## 2021-01-01 PROCEDURE — 2500000003 HC RX 250 WO HCPCS: Performed by: INTERNAL MEDICINE

## 2021-01-01 PROCEDURE — 86140 C-REACTIVE PROTEIN: CPT

## 2021-01-01 PROCEDURE — 027035Z DILATION OF CORONARY ARTERY, ONE ARTERY WITH TWO DRUG-ELUTING INTRALUMINAL DEVICES, PERCUTANEOUS APPROACH: ICD-10-PCS | Performed by: INTERNAL MEDICINE

## 2021-01-01 PROCEDURE — 86200 CCP ANTIBODY: CPT

## 2021-01-01 PROCEDURE — 32551 INSERTION OF CHEST TUBE: CPT | Performed by: INTERNAL MEDICINE

## 2021-01-01 PROCEDURE — 96365 THER/PROPH/DIAG IV INF INIT: CPT

## 2021-01-01 PROCEDURE — 86038 ANTINUCLEAR ANTIBODIES: CPT

## 2021-01-01 PROCEDURE — 99284 EMERGENCY DEPT VISIT MOD MDM: CPT

## 2021-01-01 PROCEDURE — 36556 INSERT NON-TUNNEL CV CATH: CPT | Performed by: INTERNAL MEDICINE

## 2021-01-01 PROCEDURE — 92941 PRQ TRLML REVSC TOT OCCL AMI: CPT | Performed by: INTERNAL MEDICINE

## 2021-01-01 PROCEDURE — 36556 INSERT NON-TUNNEL CV CATH: CPT

## 2021-01-01 PROCEDURE — 02HV33Z INSERTION OF INFUSION DEVICE INTO SUPERIOR VENA CAVA, PERCUTANEOUS APPROACH: ICD-10-PCS | Performed by: INTERNAL MEDICINE

## 2021-01-01 PROCEDURE — 86003 ALLG SPEC IGE CRUDE XTRC EA: CPT

## 2021-01-01 PROCEDURE — 36592 COLLECT BLOOD FROM PICC: CPT

## 2021-01-01 PROCEDURE — 0BJ08ZZ INSPECTION OF TRACHEOBRONCHIAL TREE, VIA NATURAL OR ARTIFICIAL OPENING ENDOSCOPIC: ICD-10-PCS | Performed by: INTERNAL MEDICINE

## 2021-01-01 PROCEDURE — 86431 RHEUMATOID FACTOR QUANT: CPT

## 2021-01-01 RX ORDER — IPRATROPIUM BROMIDE AND ALBUTEROL SULFATE 2.5; .5 MG/3ML; MG/3ML
1 SOLUTION RESPIRATORY (INHALATION) EVERY 6 HOURS PRN
Status: DISCONTINUED | OUTPATIENT
Start: 2021-01-01 | End: 2021-01-01

## 2021-01-01 RX ORDER — ATROPINE SULFATE 10 MG/ML
2 SOLUTION/ DROPS OPHTHALMIC EVERY 4 HOURS PRN
Status: DISCONTINUED | OUTPATIENT
Start: 2021-01-01 | End: 2021-01-01 | Stop reason: HOSPADM

## 2021-01-01 RX ORDER — HYDROCODONE BITARTRATE AND ACETAMINOPHEN 5; 325 MG/1; MG/1
1 TABLET ORAL ONCE
Status: COMPLETED | OUTPATIENT
Start: 2021-01-01 | End: 2021-01-01

## 2021-01-01 RX ORDER — LORAZEPAM 2 MG/ML
1 INJECTION INTRAMUSCULAR
Status: DISCONTINUED | OUTPATIENT
Start: 2021-01-01 | End: 2021-01-01 | Stop reason: HOSPADM

## 2021-01-01 RX ORDER — PROPOFOL 10 MG/ML
10 INJECTION, EMULSION INTRAVENOUS
Status: DISCONTINUED | OUTPATIENT
Start: 2021-01-01 | End: 2021-01-01

## 2021-01-01 RX ORDER — CIPROFLOXACIN HYDROCHLORIDE 3.5 MG/ML
1 SOLUTION/ DROPS TOPICAL EVERY 12 HOURS SCHEDULED
Status: DISCONTINUED | OUTPATIENT
Start: 2021-01-01 | End: 2021-01-01

## 2021-01-01 RX ORDER — SODIUM CHLORIDE 9 MG/ML
INJECTION, SOLUTION INTRAVENOUS PRN
Status: DISCONTINUED | OUTPATIENT
Start: 2021-01-01 | End: 2021-01-01 | Stop reason: HOSPADM

## 2021-01-01 RX ORDER — ASPIRIN 81 MG/1
81 TABLET, CHEWABLE ORAL DAILY
Status: CANCELLED | OUTPATIENT
Start: 2021-08-17

## 2021-01-01 RX ORDER — SODIUM CHLORIDE 0.9 % (FLUSH) 0.9 %
5-40 SYRINGE (ML) INJECTION EVERY 12 HOURS SCHEDULED
Status: CANCELLED | OUTPATIENT
Start: 2021-01-01

## 2021-01-01 RX ORDER — CHLORHEXIDINE GLUCONATE 0.12 MG/ML
15 RINSE ORAL 2 TIMES DAILY
Status: DISCONTINUED | OUTPATIENT
Start: 2021-01-01 | End: 2021-01-01

## 2021-01-01 RX ORDER — LORAZEPAM 2 MG/ML
0.5 INJECTION INTRAMUSCULAR
Status: DISCONTINUED | OUTPATIENT
Start: 2021-01-01 | End: 2021-01-01 | Stop reason: HOSPADM

## 2021-01-01 RX ORDER — SODIUM CHLORIDE 9 MG/ML
INJECTION, SOLUTION INTRAVENOUS CONTINUOUS
Status: CANCELLED | OUTPATIENT
Start: 2021-01-01 | End: 2021-01-01

## 2021-01-01 RX ORDER — BENZONATATE 100 MG/1
100 CAPSULE ORAL 3 TIMES DAILY PRN
Status: DISCONTINUED | OUTPATIENT
Start: 2021-01-01 | End: 2021-01-01

## 2021-01-01 RX ORDER — AMIODARONE HYDROCHLORIDE 200 MG/1
100 TABLET ORAL DAILY
COMMUNITY

## 2021-01-01 RX ORDER — NITROGLYCERIN 20 MG/100ML
INJECTION INTRAVENOUS
Status: COMPLETED
Start: 2021-01-01 | End: 2021-01-01

## 2021-01-01 RX ORDER — ACETAMINOPHEN 325 MG/1
650 TABLET ORAL EVERY 6 HOURS PRN
Status: DISCONTINUED | OUTPATIENT
Start: 2021-01-01 | End: 2021-01-01

## 2021-01-01 RX ORDER — ACETAMINOPHEN 650 MG/1
650 SUPPOSITORY RECTAL EVERY 6 HOURS PRN
Status: DISCONTINUED | OUTPATIENT
Start: 2021-01-01 | End: 2021-01-01

## 2021-01-01 RX ORDER — MORPHINE SULFATE 2 MG/ML
2 INJECTION, SOLUTION INTRAMUSCULAR; INTRAVENOUS
Status: DISCONTINUED | OUTPATIENT
Start: 2021-01-01 | End: 2021-01-01 | Stop reason: HOSPADM

## 2021-01-01 RX ORDER — SODIUM CHLORIDE 0.9 % (FLUSH) 0.9 %
5-40 SYRINGE (ML) INJECTION PRN
Status: CANCELLED | OUTPATIENT
Start: 2021-01-01

## 2021-01-01 RX ORDER — AMIODARONE HYDROCHLORIDE 200 MG/1
100 TABLET ORAL DAILY
Status: DISCONTINUED | OUTPATIENT
Start: 2021-01-01 | End: 2021-01-01

## 2021-01-01 RX ORDER — EPTIFIBATIDE 0.75 MG/ML
2 INJECTION, SOLUTION INTRAVENOUS CONTINUOUS
Status: CANCELLED | OUTPATIENT
Start: 2021-01-01 | End: 2021-01-01

## 2021-01-01 RX ORDER — WARFARIN SODIUM 5 MG/1
5 TABLET ORAL DAILY
Status: DISCONTINUED | OUTPATIENT
Start: 2021-01-01 | End: 2021-01-01

## 2021-01-01 RX ORDER — ATROPINE SULFATE 0.4 MG/ML
0.5 AMPUL (ML) INJECTION
Status: CANCELLED | OUTPATIENT
Start: 2021-01-01 | End: 2021-01-01

## 2021-01-01 RX ORDER — SODIUM CHLORIDE 9 MG/ML
25 INJECTION, SOLUTION INTRAVENOUS PRN
Status: DISCONTINUED | OUTPATIENT
Start: 2021-01-01 | End: 2021-01-01 | Stop reason: HOSPADM

## 2021-01-01 RX ORDER — NITROGLYCERIN 20 MG/100ML
5-200 INJECTION INTRAVENOUS CONTINUOUS
Status: DISCONTINUED | OUTPATIENT
Start: 2021-01-01 | End: 2021-01-01

## 2021-01-01 RX ORDER — SODIUM CHLORIDE 9 MG/ML
INJECTION, SOLUTION INTRAVENOUS CONTINUOUS
Status: DISCONTINUED | OUTPATIENT
Start: 2021-01-01 | End: 2021-01-01

## 2021-01-01 RX ORDER — LORAZEPAM 2 MG/ML
4 INJECTION INTRAMUSCULAR ONCE
Status: COMPLETED | OUTPATIENT
Start: 2021-01-01 | End: 2021-01-01

## 2021-01-01 RX ORDER — ASPIRIN 325 MG
TABLET ORAL
Status: COMPLETED
Start: 2021-01-01 | End: 2021-01-01

## 2021-01-01 RX ORDER — ATORVASTATIN CALCIUM 10 MG/1
10 TABLET, FILM COATED ORAL DAILY
Status: DISCONTINUED | OUTPATIENT
Start: 2021-01-01 | End: 2021-01-01

## 2021-01-01 RX ORDER — PROPOFOL 10 MG/ML
INJECTION, EMULSION INTRAVENOUS
Status: COMPLETED
Start: 2021-01-01 | End: 2021-01-01

## 2021-01-01 RX ORDER — PANTOPRAZOLE SODIUM 40 MG/1
40 TABLET, DELAYED RELEASE ORAL
Status: DISCONTINUED | OUTPATIENT
Start: 2021-01-01 | End: 2021-01-01

## 2021-01-01 RX ORDER — SODIUM CHLORIDE 0.9 % (FLUSH) 0.9 %
5-40 SYRINGE (ML) INJECTION EVERY 12 HOURS SCHEDULED
Status: DISCONTINUED | OUTPATIENT
Start: 2021-01-01 | End: 2021-01-01 | Stop reason: HOSPADM

## 2021-01-01 RX ORDER — FENTANYL CITRATE 50 UG/ML
INJECTION, SOLUTION INTRAMUSCULAR; INTRAVENOUS
Status: DISCONTINUED
Start: 2021-01-01 | End: 2021-01-01

## 2021-01-01 RX ORDER — FENTANYL CITRATE 50 UG/ML
25 INJECTION, SOLUTION INTRAMUSCULAR; INTRAVENOUS
Status: DISCONTINUED | OUTPATIENT
Start: 2021-01-01 | End: 2021-01-01

## 2021-01-01 RX ORDER — DILTIAZEM HYDROCHLORIDE 120 MG/1
120 CAPSULE, COATED, EXTENDED RELEASE ORAL DAILY
Status: DISCONTINUED | OUTPATIENT
Start: 2021-01-01 | End: 2021-01-01

## 2021-01-01 RX ORDER — FENTANYL CITRATE 50 UG/ML
25 INJECTION, SOLUTION INTRAMUSCULAR; INTRAVENOUS ONCE
Status: DISCONTINUED | OUTPATIENT
Start: 2021-01-01 | End: 2021-01-01

## 2021-01-01 RX ORDER — POTASSIUM CHLORIDE 20 MEQ/1
40 TABLET, EXTENDED RELEASE ORAL PRN
Status: DISCONTINUED | OUTPATIENT
Start: 2021-01-01 | End: 2021-01-01

## 2021-01-01 RX ORDER — FUROSEMIDE 10 MG/ML
40 INJECTION INTRAMUSCULAR; INTRAVENOUS 2 TIMES DAILY
Status: DISCONTINUED | OUTPATIENT
Start: 2021-01-01 | End: 2021-01-01

## 2021-01-01 RX ORDER — 0.9 % SODIUM CHLORIDE 0.9 %
1000 INTRAVENOUS SOLUTION INTRAVENOUS ONCE
Status: COMPLETED | OUTPATIENT
Start: 2021-01-01 | End: 2021-01-01

## 2021-01-01 RX ORDER — ONDANSETRON 2 MG/ML
4 INJECTION INTRAMUSCULAR; INTRAVENOUS EVERY 6 HOURS PRN
Status: CANCELLED | OUTPATIENT
Start: 2021-01-01

## 2021-01-01 RX ORDER — SODIUM CHLORIDE 9 MG/ML
25 INJECTION, SOLUTION INTRAVENOUS PRN
Status: CANCELLED | OUTPATIENT
Start: 2021-01-01

## 2021-01-01 RX ORDER — MORPHINE SULFATE 2 MG/ML
2 INJECTION, SOLUTION INTRAMUSCULAR; INTRAVENOUS
Status: CANCELLED | OUTPATIENT
Start: 2021-01-01 | End: 2021-01-01

## 2021-01-01 RX ORDER — MIDAZOLAM HYDROCHLORIDE 1 MG/ML
INJECTION INTRAMUSCULAR; INTRAVENOUS
Status: DISCONTINUED
Start: 2021-01-01 | End: 2021-01-01

## 2021-01-01 RX ORDER — LISINOPRIL 40 MG/1
40 TABLET ORAL DAILY
Status: DISCONTINUED | OUTPATIENT
Start: 2021-01-01 | End: 2021-01-01

## 2021-01-01 RX ORDER — FUROSEMIDE 10 MG/ML
20 INJECTION INTRAMUSCULAR; INTRAVENOUS 2 TIMES DAILY
Status: DISCONTINUED | OUTPATIENT
Start: 2021-01-01 | End: 2021-01-01

## 2021-01-01 RX ORDER — LACTOBACILLUS RHAMNOSUS GG 10B CELL
1 CAPSULE ORAL
Status: DISCONTINUED | OUTPATIENT
Start: 2021-01-01 | End: 2021-01-01

## 2021-01-01 RX ORDER — ATORVASTATIN CALCIUM 40 MG/1
40 TABLET, FILM COATED ORAL NIGHTLY
Status: CANCELLED | OUTPATIENT
Start: 2021-01-01

## 2021-01-01 RX ORDER — MORPHINE SULFATE 4 MG/ML
4 INJECTION, SOLUTION INTRAMUSCULAR; INTRAVENOUS
Status: DISCONTINUED | OUTPATIENT
Start: 2021-01-01 | End: 2021-01-01 | Stop reason: HOSPADM

## 2021-01-01 RX ORDER — SODIUM CHLORIDE, SODIUM LACTATE, POTASSIUM CHLORIDE, CALCIUM CHLORIDE 600; 310; 30; 20 MG/100ML; MG/100ML; MG/100ML; MG/100ML
INJECTION, SOLUTION INTRAVENOUS CONTINUOUS
Status: ACTIVE | OUTPATIENT
Start: 2021-01-01 | End: 2021-01-01

## 2021-01-01 RX ORDER — FUROSEMIDE 10 MG/ML
40 INJECTION INTRAMUSCULAR; INTRAVENOUS ONCE
Status: COMPLETED | OUTPATIENT
Start: 2021-01-01 | End: 2021-01-01

## 2021-01-01 RX ORDER — ACETAMINOPHEN 325 MG/1
650 TABLET ORAL EVERY 4 HOURS PRN
Status: CANCELLED | OUTPATIENT
Start: 2021-01-01

## 2021-01-01 RX ORDER — MORPHINE SULFATE 4 MG/ML
4 INJECTION, SOLUTION INTRAMUSCULAR; INTRAVENOUS ONCE
Status: COMPLETED | OUTPATIENT
Start: 2021-01-01 | End: 2021-01-01

## 2021-01-01 RX ORDER — DEXAMETHASONE SODIUM PHOSPHATE 10 MG/ML
10 INJECTION, SOLUTION INTRAMUSCULAR; INTRAVENOUS EVERY 24 HOURS
Status: DISCONTINUED | OUTPATIENT
Start: 2021-01-01 | End: 2021-01-01

## 2021-01-01 RX ORDER — SODIUM CHLORIDE 0.9 % (FLUSH) 0.9 %
5-40 SYRINGE (ML) INJECTION PRN
Status: DISCONTINUED | OUTPATIENT
Start: 2021-01-01 | End: 2021-01-01 | Stop reason: HOSPADM

## 2021-01-01 RX ORDER — ROCURONIUM BROMIDE 10 MG/ML
50 INJECTION, SOLUTION INTRAVENOUS ONCE
Status: COMPLETED | OUTPATIENT
Start: 2021-01-01 | End: 2021-01-01

## 2021-01-01 RX ORDER — POTASSIUM CHLORIDE 7.45 MG/ML
10 INJECTION INTRAVENOUS PRN
Status: DISCONTINUED | OUTPATIENT
Start: 2021-01-01 | End: 2021-01-01

## 2021-01-01 RX ORDER — MIDAZOLAM HYDROCHLORIDE 1 MG/ML
2 INJECTION INTRAMUSCULAR; INTRAVENOUS ONCE
Status: COMPLETED | OUTPATIENT
Start: 2021-01-01 | End: 2021-01-01

## 2021-01-01 RX ORDER — EPTIFIBATIDE 0.75 MG/ML
1 INJECTION, SOLUTION INTRAVENOUS CONTINUOUS
Status: DISCONTINUED | OUTPATIENT
Start: 2021-01-01 | End: 2021-01-01

## 2021-01-01 RX ORDER — LISINOPRIL 5 MG/1
2.5 TABLET ORAL DAILY
Status: CANCELLED | OUTPATIENT
Start: 2021-01-01

## 2021-01-01 RX ADMIN — MIDAZOLAM 2 MG: 1 INJECTION INTRAMUSCULAR; INTRAVENOUS at 05:11

## 2021-01-01 RX ADMIN — CIPROFLOXACIN 1 DROP: 3 SOLUTION OPHTHALMIC at 09:01

## 2021-01-01 RX ADMIN — SODIUM CHLORIDE 1000 ML: 9 INJECTION, SOLUTION INTRAVENOUS at 21:04

## 2021-01-01 RX ADMIN — Medication 10 ML: at 20:19

## 2021-01-01 RX ADMIN — MIDAZOLAM 5 MG/HR: 5 INJECTION INTRAMUSCULAR; INTRAVENOUS at 05:52

## 2021-01-01 RX ADMIN — CEFEPIME HYDROCHLORIDE 2000 MG: 2 INJECTION, POWDER, FOR SOLUTION INTRAVENOUS at 04:57

## 2021-01-01 RX ADMIN — FUROSEMIDE 20 MG: 10 INJECTION, SOLUTION INTRAMUSCULAR; INTRAVENOUS at 10:30

## 2021-01-01 RX ADMIN — IOPAMIDOL 140 ML: 755 INJECTION, SOLUTION INTRAVENOUS at 02:15

## 2021-01-01 RX ADMIN — AMIODARONE HYDROCHLORIDE 100 MG: 200 TABLET ORAL at 09:21

## 2021-01-01 RX ADMIN — ACETAMINOPHEN 650 MG: 325 TABLET ORAL at 08:35

## 2021-01-01 RX ADMIN — Medication 200 MCG/HR: at 05:43

## 2021-01-01 RX ADMIN — AMIODARONE HYDROCHLORIDE 100 MG: 200 TABLET ORAL at 09:00

## 2021-01-01 RX ADMIN — CIPROFLOXACIN 1 DROP: 3 SOLUTION OPHTHALMIC at 08:35

## 2021-01-01 RX ADMIN — FUROSEMIDE 40 MG: 10 INJECTION, SOLUTION INTRAMUSCULAR; INTRAVENOUS at 08:59

## 2021-01-01 RX ADMIN — PANTOPRAZOLE SODIUM 40 MG: 40 TABLET, DELAYED RELEASE ORAL at 06:59

## 2021-01-01 RX ADMIN — Medication 8 MCG/MIN: at 04:47

## 2021-01-01 RX ADMIN — SODIUM CHLORIDE: 9 INJECTION, SOLUTION INTRAVENOUS at 04:51

## 2021-01-01 RX ADMIN — IPRATROPIUM BROMIDE AND ALBUTEROL SULFATE 1 AMPULE: .5; 3 SOLUTION RESPIRATORY (INHALATION) at 09:08

## 2021-01-01 RX ADMIN — Medication 1 CAPSULE: at 08:35

## 2021-01-01 RX ADMIN — AZITHROMYCIN MONOHYDRATE 500 MG: 500 INJECTION, POWDER, LYOPHILIZED, FOR SOLUTION INTRAVENOUS at 21:53

## 2021-01-01 RX ADMIN — FUROSEMIDE 40 MG: 10 INJECTION, SOLUTION INTRAMUSCULAR; INTRAVENOUS at 23:46

## 2021-01-01 RX ADMIN — APIXABAN 5 MG: 5 TABLET, FILM COATED ORAL at 20:35

## 2021-01-01 RX ADMIN — ASPIRIN 325 MG ORAL TABLET 325 MG: 325 PILL ORAL at 01:02

## 2021-01-01 RX ADMIN — APIXABAN 5 MG: 5 TABLET, FILM COATED ORAL at 00:51

## 2021-01-01 RX ADMIN — FUROSEMIDE 40 MG: 10 INJECTION, SOLUTION INTRAMUSCULAR; INTRAVENOUS at 08:35

## 2021-01-01 RX ADMIN — VANCOMYCIN HYDROCHLORIDE 1000 MG: 1 INJECTION, POWDER, LYOPHILIZED, FOR SOLUTION INTRAVENOUS at 15:38

## 2021-01-01 RX ADMIN — ACETAMINOPHEN 650 MG: 325 TABLET ORAL at 18:57

## 2021-01-01 RX ADMIN — APIXABAN 5 MG: 5 TABLET, FILM COATED ORAL at 08:59

## 2021-01-01 RX ADMIN — VASOPRESSIN 0.04 UNITS/MIN: 20 INJECTION INTRAVENOUS at 06:51

## 2021-01-01 RX ADMIN — PROPOFOL 20 MCG/KG/MIN: 10 INJECTION, EMULSION INTRAVENOUS at 04:50

## 2021-01-01 RX ADMIN — FUROSEMIDE 40 MG: 10 INJECTION, SOLUTION INTRAMUSCULAR; INTRAVENOUS at 16:55

## 2021-01-01 RX ADMIN — Medication 10 ML: at 09:00

## 2021-01-01 RX ADMIN — MORPHINE SULFATE 4 MG: 4 INJECTION, SOLUTION INTRAMUSCULAR; INTRAVENOUS at 08:39

## 2021-01-01 RX ADMIN — DEXAMETHASONE SODIUM PHOSPHATE 10 MG: 10 INJECTION, SOLUTION INTRAMUSCULAR; INTRAVENOUS at 15:12

## 2021-01-01 RX ADMIN — AZITHROMYCIN MONOHYDRATE 500 MG: 500 INJECTION, POWDER, LYOPHILIZED, FOR SOLUTION INTRAVENOUS at 22:03

## 2021-01-01 RX ADMIN — AZITHROMYCIN MONOHYDRATE 500 MG: 500 INJECTION, POWDER, LYOPHILIZED, FOR SOLUTION INTRAVENOUS at 21:45

## 2021-01-01 RX ADMIN — Medication 10 ML: at 00:15

## 2021-01-01 RX ADMIN — APIXABAN 5 MG: 5 TABLET, FILM COATED ORAL at 09:21

## 2021-01-01 RX ADMIN — PROPOFOL 30 MCG/KG/MIN: 10 INJECTION, EMULSION INTRAVENOUS at 04:17

## 2021-01-01 RX ADMIN — CEFEPIME HYDROCHLORIDE 2000 MG: 2 INJECTION, POWDER, FOR SOLUTION INTRAVENOUS at 14:53

## 2021-01-01 RX ADMIN — BENZONATATE 100 MG: 100 CAPSULE ORAL at 08:35

## 2021-01-01 RX ADMIN — Medication 50 MCG/HR: at 04:51

## 2021-01-01 RX ADMIN — APIXABAN 5 MG: 5 TABLET, FILM COATED ORAL at 20:18

## 2021-01-01 RX ADMIN — FUROSEMIDE 40 MG: 10 INJECTION, SOLUTION INTRAMUSCULAR; INTRAVENOUS at 17:03

## 2021-01-01 RX ADMIN — CIPROFLOXACIN 1 DROP: 3 SOLUTION OPHTHALMIC at 17:33

## 2021-01-01 RX ADMIN — CEFEPIME HYDROCHLORIDE 2000 MG: 2 INJECTION, POWDER, FOR SOLUTION INTRAVENOUS at 05:01

## 2021-01-01 RX ADMIN — DEXAMETHASONE SODIUM PHOSPHATE 10 MG: 10 INJECTION, SOLUTION INTRAMUSCULAR; INTRAVENOUS at 16:39

## 2021-01-01 RX ADMIN — APIXABAN 5 MG: 5 TABLET, FILM COATED ORAL at 21:46

## 2021-01-01 RX ADMIN — ACETAMINOPHEN 650 MG: 325 TABLET ORAL at 22:44

## 2021-01-01 RX ADMIN — AMIODARONE HYDROCHLORIDE 100 MG: 200 TABLET ORAL at 08:35

## 2021-01-01 RX ADMIN — Medication 10 ML: at 22:41

## 2021-01-01 RX ADMIN — SODIUM CHLORIDE: 9 INJECTION, SOLUTION INTRAVENOUS at 02:51

## 2021-01-01 RX ADMIN — ROCURONIUM BROMIDE 50 MG: 10 SOLUTION INTRAVENOUS at 05:44

## 2021-01-01 RX ADMIN — ACETAMINOPHEN 650 MG: 325 TABLET ORAL at 20:34

## 2021-01-01 RX ADMIN — ACETAMINOPHEN 650 MG: 325 TABLET ORAL at 09:26

## 2021-01-01 RX ADMIN — CIPROFLOXACIN 1 DROP: 3 SOLUTION OPHTHALMIC at 22:36

## 2021-01-01 RX ADMIN — AZITHROMYCIN MONOHYDRATE 500 MG: 500 INJECTION, POWDER, LYOPHILIZED, FOR SOLUTION INTRAVENOUS at 21:21

## 2021-01-01 RX ADMIN — DILTIAZEM HYDROCHLORIDE 120 MG: 120 CAPSULE, COATED, EXTENDED RELEASE ORAL at 08:35

## 2021-01-01 RX ADMIN — APIXABAN 5 MG: 5 TABLET, FILM COATED ORAL at 22:36

## 2021-01-01 RX ADMIN — EPTIFIBATIDE 1 MCG/KG/MIN: 75 INJECTION INTRAVENOUS at 02:29

## 2021-01-01 RX ADMIN — CEFTRIAXONE 1000 MG: 1 INJECTION, POWDER, FOR SOLUTION INTRAMUSCULAR; INTRAVENOUS at 20:31

## 2021-01-01 RX ADMIN — Medication 10 ML: at 21:41

## 2021-01-01 RX ADMIN — VANCOMYCIN HYDROCHLORIDE 1000 MG: 1 INJECTION, POWDER, LYOPHILIZED, FOR SOLUTION INTRAVENOUS at 16:55

## 2021-01-01 RX ADMIN — SODIUM CHLORIDE 25 ML: 9 INJECTION, SOLUTION INTRAVENOUS at 16:51

## 2021-01-01 RX ADMIN — APIXABAN 5 MG: 5 TABLET, FILM COATED ORAL at 09:00

## 2021-01-01 RX ADMIN — CEFEPIME HYDROCHLORIDE 2000 MG: 2 INJECTION, POWDER, FOR SOLUTION INTRAVENOUS at 16:44

## 2021-01-01 RX ADMIN — SODIUM CHLORIDE, POTASSIUM CHLORIDE, SODIUM LACTATE AND CALCIUM CHLORIDE: 600; 310; 30; 20 INJECTION, SOLUTION INTRAVENOUS at 00:51

## 2021-01-01 RX ADMIN — SODIUM CHLORIDE 25 ML: 9 INJECTION, SOLUTION INTRAVENOUS at 21:19

## 2021-01-01 RX ADMIN — POTASSIUM CHLORIDE 40 MEQ: 20 TABLET, EXTENDED RELEASE ORAL at 18:25

## 2021-01-01 RX ADMIN — CEFTRIAXONE 1000 MG: 1 INJECTION, POWDER, FOR SOLUTION INTRAMUSCULAR; INTRAVENOUS at 21:11

## 2021-01-01 RX ADMIN — CIPROFLOXACIN 1 DROP: 3 SOLUTION OPHTHALMIC at 21:00

## 2021-01-01 RX ADMIN — LORAZEPAM 4 MG: 2 INJECTION INTRAMUSCULAR; INTRAVENOUS at 08:39

## 2021-01-01 RX ADMIN — FUROSEMIDE 20 MG: 10 INJECTION, SOLUTION INTRAMUSCULAR; INTRAVENOUS at 09:01

## 2021-01-01 RX ADMIN — FUROSEMIDE 40 MG: 10 INJECTION, SOLUTION INTRAMUSCULAR; INTRAVENOUS at 03:18

## 2021-01-01 RX ADMIN — SODIUM CHLORIDE 25 ML: 9 INJECTION, SOLUTION INTRAVENOUS at 16:42

## 2021-01-01 RX ADMIN — ACETAMINOPHEN 650 MG: 325 TABLET ORAL at 22:38

## 2021-01-01 RX ADMIN — AMIODARONE HYDROCHLORIDE 100 MG: 200 TABLET ORAL at 08:59

## 2021-01-01 RX ADMIN — SODIUM CHLORIDE 25 ML: 9 INJECTION, SOLUTION INTRAVENOUS at 20:29

## 2021-01-01 RX ADMIN — FUROSEMIDE 20 MG: 10 INJECTION, SOLUTION INTRAMUSCULAR; INTRAVENOUS at 17:33

## 2021-01-01 RX ADMIN — ACETAMINOPHEN 650 MG: 325 TABLET ORAL at 06:34

## 2021-01-01 RX ADMIN — DILTIAZEM HYDROCHLORIDE 120 MG: 120 CAPSULE, COATED, EXTENDED RELEASE ORAL at 09:21

## 2021-01-01 RX ADMIN — APIXABAN 5 MG: 5 TABLET, FILM COATED ORAL at 08:35

## 2021-01-01 RX ADMIN — Medication 10 ML: at 08:44

## 2021-01-01 RX ADMIN — CEFEPIME HYDROCHLORIDE 2000 MG: 2 INJECTION, POWDER, FOR SOLUTION INTRAVENOUS at 15:13

## 2021-01-01 RX ADMIN — BENZONATATE 100 MG: 100 CAPSULE ORAL at 08:58

## 2021-01-01 RX ADMIN — DILTIAZEM HYDROCHLORIDE 120 MG: 120 CAPSULE, COATED, EXTENDED RELEASE ORAL at 09:00

## 2021-01-01 RX ADMIN — Medication 10 ML: at 09:01

## 2021-01-01 RX ADMIN — DILTIAZEM HYDROCHLORIDE 120 MG: 120 CAPSULE, COATED, EXTENDED RELEASE ORAL at 08:58

## 2021-01-01 RX ADMIN — FUROSEMIDE 40 MG: 10 INJECTION, SOLUTION INTRAMUSCULAR; INTRAVENOUS at 18:21

## 2021-01-01 RX ADMIN — IPRATROPIUM BROMIDE AND ALBUTEROL SULFATE 1 AMPULE: .5; 3 SOLUTION RESPIRATORY (INHALATION) at 00:28

## 2021-01-01 RX ADMIN — HYDROCODONE BITARTRATE AND ACETAMINOPHEN 1 TABLET: 5; 325 TABLET ORAL at 22:08

## 2021-01-01 RX ADMIN — ACETAMINOPHEN 650 MG: 325 TABLET ORAL at 21:46

## 2021-01-01 RX ADMIN — DEXAMETHASONE SODIUM PHOSPHATE 10 MG: 10 INJECTION, SOLUTION INTRAMUSCULAR; INTRAVENOUS at 14:51

## 2021-01-01 RX ADMIN — PANTOPRAZOLE SODIUM 40 MG: 40 TABLET, DELAYED RELEASE ORAL at 05:51

## 2021-01-01 RX ADMIN — SODIUM CHLORIDE 25 ML: 9 INJECTION, SOLUTION INTRAVENOUS at 21:43

## 2021-01-01 RX ADMIN — Medication 1 CAPSULE: at 08:59

## 2021-01-01 RX ADMIN — NITROGLYCERIN 5 MCG/MIN: 20 INJECTION INTRAVENOUS at 00:11

## 2021-01-01 RX ADMIN — BENZONATATE 100 MG: 100 CAPSULE ORAL at 21:46

## 2021-01-01 ASSESSMENT — PAIN DESCRIPTION - LOCATION
LOCATION: BACK
LOCATION: GENERALIZED
LOCATION: BACK
LOCATION: GENERALIZED
LOCATION: BACK
LOCATION: HEAD

## 2021-01-01 ASSESSMENT — PULMONARY FUNCTION TESTS
PIF_VALUE: 38
PIF_VALUE: 41
PIF_VALUE: 43
PIF_VALUE: 31
PIF_VALUE: 47
PIF_VALUE: 53
PIF_VALUE: 46
PIF_VALUE: 33
PIF_VALUE: 18
PIF_VALUE: 39
PIF_VALUE: 13
PIF_VALUE: 41
PIF_VALUE: 47
PIF_VALUE: 35
PIF_VALUE: 37
PIF_VALUE: 31
PIF_VALUE: 46
PIF_VALUE: 13
PIF_VALUE: 53
PIF_VALUE: 44
PIF_VALUE: 38
PIF_VALUE: 54
PIF_VALUE: 22
PIF_VALUE: 35
PIF_VALUE: 40
PIF_VALUE: 40
PIF_VALUE: 32
PIF_VALUE: 41
PIF_VALUE: 46
PIF_VALUE: 41
PIF_VALUE: 32
PIF_VALUE: 55
PIF_VALUE: 42
PIF_VALUE: 9

## 2021-01-01 ASSESSMENT — PAIN SCALES - GENERAL
PAINLEVEL_OUTOF10: 0
PAINLEVEL_OUTOF10: 0
PAINLEVEL_OUTOF10: 3
PAINLEVEL_OUTOF10: 3
PAINLEVEL_OUTOF10: 0
PAINLEVEL_OUTOF10: 10
PAINLEVEL_OUTOF10: 0
PAINLEVEL_OUTOF10: 2
PAINLEVEL_OUTOF10: 0
PAINLEVEL_OUTOF10: 3
PAINLEVEL_OUTOF10: 0
PAINLEVEL_OUTOF10: 0
PAINLEVEL_OUTOF10: 3
PAINLEVEL_OUTOF10: 2

## 2021-01-01 ASSESSMENT — PAIN DESCRIPTION - ORIENTATION
ORIENTATION: OTHER (COMMENT)
ORIENTATION: ANTERIOR

## 2021-01-01 ASSESSMENT — PAIN DESCRIPTION - FREQUENCY
FREQUENCY: INTERMITTENT
FREQUENCY: CONTINUOUS
FREQUENCY: CONTINUOUS
FREQUENCY: INTERMITTENT
FREQUENCY: CONTINUOUS
FREQUENCY: CONTINUOUS

## 2021-01-01 ASSESSMENT — PAIN DESCRIPTION - PAIN TYPE
TYPE: ACUTE PAIN

## 2021-01-01 ASSESSMENT — PAIN DESCRIPTION - DIRECTION: RADIATING_TOWARDS: NON

## 2021-01-01 ASSESSMENT — PAIN DESCRIPTION - DESCRIPTORS
DESCRIPTORS: DISCOMFORT
DESCRIPTORS: SHARP
DESCRIPTORS: HEADACHE
DESCRIPTORS: SHARP
DESCRIPTORS: DISCOMFORT

## 2021-01-01 ASSESSMENT — PAIN - FUNCTIONAL ASSESSMENT
PAIN_FUNCTIONAL_ASSESSMENT: ACTIVITIES ARE NOT PREVENTED

## 2021-01-01 ASSESSMENT — PAIN DESCRIPTION - PROGRESSION
CLINICAL_PROGRESSION: GRADUALLY WORSENING
CLINICAL_PROGRESSION: NOT CHANGED
CLINICAL_PROGRESSION: GRADUALLY WORSENING
CLINICAL_PROGRESSION: NOT CHANGED
CLINICAL_PROGRESSION: NOT CHANGED

## 2021-01-01 ASSESSMENT — PAIN DESCRIPTION - ONSET
ONSET: ON-GOING
ONSET: ON-GOING
ONSET: GRADUAL
ONSET: ON-GOING
ONSET: GRADUAL
ONSET: ON-GOING

## 2021-08-11 PROBLEM — A41.9 SEPSIS (HCC): Status: ACTIVE | Noted: 2021-01-01

## 2021-08-11 NOTE — ED NOTES
Bed: A-15  Expected date: 8/11/21  Expected time: 7:50 PM  Means of arrival: Walk In  Comments:  Shala Gallegos RN  08/11/21 1955

## 2021-08-12 PROBLEM — J96.01 ACUTE RESPIRATORY FAILURE WITH HYPOXIA (HCC): Status: ACTIVE | Noted: 2021-01-01

## 2021-08-12 PROBLEM — J15.9 COMMUNITY ACQUIRED BACTERIAL PNEUMONIA: Status: ACTIVE | Noted: 2021-01-01

## 2021-08-12 NOTE — ED NOTES
ED SBAR report provider to [address]. Patient to be transported to Room 42 via stretcher by transport tech. Patient transported with bedside cardiac monitor and with IV medications infusing. IV site clean, dry, and intact. MEWS score updated and pain assessed and documented. Updated patient on plan of care.        Malaika Kohler RN  08/11/21 0102

## 2021-08-12 NOTE — ACP (ADVANCE CARE PLANNING)
Advance Care Planning     Advance Care Planning Activator (Inpatient)  Conversation Note      Date of ACP Conversation: 8/12/2021     Conversation Conducted with: Patient with Decision Making Capacity    ACP Activator: [address] Decision Maker:     Current Designated Health Care Decision Maker:     Primary Decision Maker: Papo Garcia - [phone]    Secondary Decision Maker: Kate Greer Roz Tati - [phone]    Today we documented Decision Maker(s). The patient will provide ACP documents. Care Preferences    Ventilation: \"If you were in your present state of health and suddenly became very ill and were unable to breathe on your own, what would your preference be about the use of a ventilator (breathing machine) if it were available to you? \"      Would the patient desire the use of ventilator (breathing machine)?: no    \"If your health worsens and it becomes clear that your chance of recovery is unlikely, what would your preference be about the use of a ventilator (breathing machine) if it were available to you? \"     Would the patient desire the use of ventilator (breathing machine)?: No      Resuscitation  \"CPR works best to restart the heart when there is a sudden event, like a heart attack, in someone who is otherwise healthy. Unfortunately, CPR does not typically restart the heart for people who have serious health conditions or who are very sick. \"    \"In the event your heart stopped as a result of an underlying serious health condition, would you want attempts to be made to restart your heart (answer \"yes\" for attempt to resuscitate) or would you prefer a natural death (answer \"no\" for do not attempt to resuscitate)? \" no       [] Yes   [x] No   Educated Patient / Cindy Daniels regarding differences between Advance Directives and portable DNR orders.     Length of ACP Conversation in minutes:  10  Conversation Outcomes:  [] ACP discussion completed  [] Existing advance directive reviewed with patient; no changes to patient's previously recorded wishes  [] New Advance Directive completed  [] Portable Do Not Rescitate prepared for Provider review and signature  [] POLST/POST/MOLST/MOST prepared for Provider review and signature      Follow-up plan:    [x] Schedule follow-up conversation to continue planning  [] Referred individual to Provider for additional questions/concerns   [] Advised patient/agent/surrogate to review completed ACP document and update if needed with changes in condition, patient preferences or care setting    [x] This note routed to one or more involved healthcare providers  SW sent a perfectserve to Dr. Lora Neely to inform her that this pt is stating that she would like her code status to be a DNR-CCA.     Justen Bachelor, [place]  [phone]  Electronically signed by Julian Blount on 8/12/2021 at 11:02 AM

## 2021-08-12 NOTE — ED PROVIDER NOTES
Activity    Alcohol use: Not Currently    Drug use: Not Currently    Sexual activity: Not on file   Other Topics Concern    Not on file   Social History Narrative    Not on file     Social Determinants of Health     Financial Resource Strain:     Difficulty of Paying Living Expenses:    Food Insecurity:     Worried About Running Out of Food in the Last Year:     [address] in the Last Year:    Transportation Needs:     Lack of Transportation (Medical):      Lack of Transportation (Non-Medical):    Physical Activity:     Days of Exercise per Week:     Minutes of Exercise per Session:    Stress:     Feeling of Stress :    Social Connections:     Frequency of Communication with Friends and Family:     Frequency of Social Gatherings with Friends and Family:     Attends Congregation Services:     Active Member of Clubs or Organizations:     Attends Club or Organization Meetings:     Marital Status:    Intimate Partner Violence:     Fear of Current or Ex-Partner:     Emotionally Abused:     Physically Abused:     Sexually Abused:      Current Facility-Administered Medications   Medication Dose Route Frequency Provider Last Rate Last Admin    0.9 % sodium chloride bolus  1,000 mL Intravenous Once Loretta Mchugh MD 1,000 mL/hr at 08/11/21 2104 1,000 mL at 08/11/21 2104    azithromycin (ZITHROMAX) 500 mg in D5W 250ml addavial  500 mg Intravenous Once Loretta Mchugh MD        cefTRIAXone (ROCEPHIN) 1000 mg IVPB in 50 mL D5W minibag  1,000 mg Intravenous Once Loretta Mchugh  mL/hr at 08/11/21 2111 1,000 mg at 08/11/21 2111     Current Outpatient Medications   Medication Sig Dispense Refill    lisinopril (PRINIVIL;ZESTRIL) 20 MG tablet Take 2 tablets by mouth daily 60 tablet 3    simvastatin (ZOCOR) 20 MG tablet Take 1 tablet by mouth nightly 30 tablet 3    CARTIA  MG extended release capsule Take 120 mg by mouth daily      warfarin (COUMADIN) 5 MG tablet Take 7.5 mg by mouth every evening except 5 mg on Mon,Thurs      B Complex Vitamins (VITAMIN B COMPLEX PO) Take 1 tablet by mouth daily      Cholecalciferol (VITAMIN D) 2000 units CAPS capsule Take 2,000 Units by mouth daily      hydrochlorothiazide (HYDRODIURIL) 25 MG tablet Take 25 mg by mouth daily       Allergies   Allergen Reactions    Sulfa Antibiotics          REVIEW OF SYSTEMS  10 systems reviewed, pertinent positives per HPI otherwise noted to be negative    PHYSICAL EXAM  BP (!) 148/62   Pulse 86   Temp 99.2 °F (37.3 °C) (Oral)   Resp 18   SpO2 95%      CONSTITUTIONAL: AOx4, cooperative with exam, afebrile   HEAD: normocephalic, atraumatic   EYES: PERRL, EOMI, anicteric sclera   ENT: Moist mucous membranes, uvula midline, no swelling of the posterior pharynx   NECK: Supple, symmetric, trachea midline, no stridor   LUNGS: Bilateral breath sounds, rales in the lung bases bilaterally, no wheezing, no increased work of breathing   CARDIOVASCULAR: RRR, normal S1/S2, no m/r/g, 2+ pulses throughout   ABDOMEN: Soft, non-tender, non-distended, +BS   NEUROLOGIC:  MAEx4, GCS 15   MUSCULOSKELETAL: No clubbing, cyanosis or edema   SKIN: No rash, pallor or wounds on exposed surfaces         RADIOLOGY  X-RAYS:  I have reviewed radiologic plain film image(s). ALL OTHER NON-PLAIN FILM IMAGES SUCH AS CT, ULTRASOUND AND MRI HAVE BEEN READ BY THE RADIOLOGIST. XR CHEST (2 VW)   Final Result   Mild cardiomegaly and mild pulmonary edema which is more prominent. Hazy parenchymal opacities scattered in both lungs which could be due to   pulmonary edema and/or associated developing pneumonia. Small bibasilar pleural effusions.       RECOMMENDATION:   Mild                EKG INTERPRETATION  Normal sinus rhythm with rate 86, normal axis, QRS 88, QTc 494, no ST elevations, nonspecific ST changes, no acute change compared EKG from 3/13/2019    PROCEDURES    ED COURSE/MDM  Pneumonia, URI, acute respiratory failure, hypoxia, COVID-19  Patient seen and evaluated. History and physical as above. Nontoxic, afebrile. Patient arrives for diagnosis of pneumonia outpatient and for possible admission. Patient does arrive hypoxic in the high 80s on room air. No home oxygen use. Tolerating 2 L nasal cannula. Does have rales in the lung bases bilaterally. Plan for routine labs, lactic acid, blood cultures, COVID-19 test, troponin, BNP and chest x-ray. Plan for admission. ED Course as of Aug 11 2133   Wed Aug 11, 2021   2059 Patient without any tachycardia, tachypnea or fever to suggest sepsis although patient does have evidence of pneumonia on chest x-ray, lactic acid 2.3 and white count of 12.9 therefore will treat as sepsis. Does not require 30 mL/kg fluid bolus secondary to lactic acid only at 2.8. Covid not detected. Azithromycin and ceftriaxone ordered for antibiotic coverage for community-acquired pneumonia. [DS]   2121 Also found to have PIETER. Creatinine 1.3. Baseline 0.7. IV fluid bolus running at this time. Consult placed to hospitalist for admission for bilateral pneumonia with hypoxia. [DS]      ED Course User Index  [DS] Tabatha Bailey MD         Patient was given scripts for the following medications. I counseled patient how to take these medications. New Prescriptions    No medications on file     CRITICAL CARE TIME   Total Critical Care time was 33 minutes, excluding separately reportable procedures. There was a high probability of clinically significant/life threatening deterioration in the patient's condition which required my urgent intervention. CLINICAL IMPRESSION  1. Community acquired bilateral lower lobe pneumonia    2. Acute respiratory failure with hypoxia (HCC)    3. PIETER (acute kidney injury) ([facility] [address]        Blood pressure (!) 148/62, pulse 86, temperature 99.2 °F (37.3 °C), temperature source Oral, resp. rate 18, SpO2 95 %. DISPOSITION  Admitted      Disclaimer:  All medical record entries made by [address] [place] [address] dictation.       (Please note that this note was completed with a voice recognition program. Every attempt was made to edit the dictations, but inevitably there remain words that are mis-transcribed.)            Denilson Renner MD  08/11/21 9512       Denilson Renner MD  08/11/21 5005

## 2021-08-12 NOTE — PROGRESS NOTES
Patient admitted to room 4269. Alert and oriented x4. Call light within reach. Oriented to room. Pt admitted for [facility]. Hard of hearing/ no skin issues. Pt states she is a DNR and will ask her daughter for paper work in the morning. Pt resting comfortable. Rere Dunham all fall precautions in place.

## 2021-08-12 NOTE — PROGRESS NOTES
Hospitalist Progress Note      PCP: Kris Ochoa    Date of Admission: 8/11/2021    Chief Complaint: hypoxia    Hospital Course:    Subjective: fevers improved       Medications:  Reviewed    Infusion Medications    sodium chloride       Scheduled Medications    amiodarone  100 mg Oral Daily    apixaban  5 mg Oral BID    furosemide  20 mg Intravenous BID    dilTIAZem  120 mg Oral Daily    [Held by provider] lisinopril  40 mg Oral Daily    sodium chloride flush  5-40 mL Intravenous 2 times per day    azithromycin  500 mg Intravenous Q24H    cefTRIAXone (ROCEPHIN) IV  1,000 mg Intravenous Q24H     PRN Meds: sodium chloride flush, sodium chloride, acetaminophen **OR** acetaminophen, benzonatate, ipratropium-albuterol      Intake/Output Summary (Last 24 hours) at 8/12/2021 0990  Last data filed at 8/12/2021 0509  Gross per 24 hour   Intake 302.23 ml   Output 300 ml   Net 2.23 ml       Exam:    BP (!) 157/74   Pulse 86   Temp 99 °F (37.2 °C) (Oral)   Resp 20   Ht 5' 3\" (1.6 m)   Wt 164 lb 10.9 oz (74.7 kg)   SpO2 98%   BMI 29.17 kg/m²     General appearance: No apparent distress, appears stated age and cooperative. HEENT: Pupils equal, round, and reactive to light. Conjunctivae/corneas clear. Neck: Supple, with full range of motion. No jugular venous distention. Trachea midline. Respiratory:  Normal respiratory effort. Clear to auscultation, bilaterally without Rales/Wheezes/Rhonchi. Cardiovascular: Regular rate and rhythm with normal S1/S2 without murmurs, rubs or gallops. Abdomen: Soft, non-tender, non-distended with normal bowel sounds. Musculoskeletal: No clubbing, cyanosis or edema bilaterally. Full range of motion without deformity. Skin: Skin color, texture, turgor normal.  No rashes or lesions. Neurologic:  Neurovascularly intact without any focal sensory/motor deficits.  Cranial nerves: II-XII intact, grossly non-focal.  Psychiatric: Alert and oriented, thought content appropriate, normal insight  Capillary Refill: Brisk,< 3 seconds   Peripheral Pulses: +2 palpable, equal bilaterally       Labs:   Recent Labs     08/11/21 2007 08/12/21  0735   WBC 12.9* 13.2*   HGB 12.4 12.0   HCT 36.5 35.1*    346     Recent Labs     08/11/21 2007 08/12/21  0735   * 137   K 3.6 3.6   CL 95* 100   CO2 25 28   BUN 18 13   CREATININE 1.3* 1.0   CALCIUM 9.0 8.9     No results for input(s): AST, ALT, BILIDIR, BILITOT, ALKPHOS in the last 72 hours. Recent Labs     08/12/21  0735   INR 1.93*     Recent Labs     08/11/21 2007   TROPONINI 0.01       Urinalysis:    No results found for: Raman Harada, BACTERIA, RBCUA, BLOODU, SPECGRAV, GLUCOSEU    Radiology:  XR CHEST (2 VW)   Final Result   Mild cardiomegaly and mild pulmonary edema which is more prominent. Hazy parenchymal opacities scattered in both lungs which could be due to   pulmonary edema and/or associated developing pneumonia. Small bibasilar pleural effusions. RECOMMENDATION:   Mild                 Assessment/Plan:    Active Hospital Problems    Diagnosis Date Noted    Acute respiratory failure with hypoxia ([facility] [address] [J96.01] 08/12/2021    Community acquired bacterial pneumonia [J15.9] 08/12/2021    Sepsis ([facility] [address] [A41.9] 08/11/2021     Hypoxia:  - antibiotics  - lasix    Sepsis:  - treat pneumonia  - f/u blood cultures    Pneumonia:  - CAP antibiotics    PIETER:  - holding lisinopril    H/o paroxysmal A fib:  Cont Eliquis, amiodarone  HTN:  Cont home medications    DVT Prophylaxis: Eliquis  Diet: ADULT DIET;  Regular  Code Status: Full Code    PT/OT Eval Status: SNF    Dispo - Kaycee Shaikh MD

## 2021-08-12 NOTE — H&P
Hospital Medicine History & Physical      PCP: Remy Quispe    Date of Admission: 8/11/2021    Date of Service: Pt seen/examined on 8/11/2021 and Admitted to Inpatient       Chief Complaint: Cough, shortness of breath x4 days      History Of Present Illness: The patient is a 80 y.o. female who presents to [facility] with PMHx: Atrial fibrillation, TIA, arthritis    Anticoagulation therapy: Eliquis  Covid vaccination: Series completed x2    Patient presented to the emergency department with a 4-day complaint of cough and shortness of breath for approximately 4 days. Was seen by her primary care physician 2 days ago and was diagnosed with pneumonia and initiated treatment. She was not feeling much better yesterday, PCP followed up and encouraged her to come to the ED but she did not want to. The fatigue and generalized weakness persisted and she decided to go ahead and come on in. Reports a nonproductive cough. Positive nausea negative vomiting. Negative diarrhea. Negative chest pain. Negative increased urinary frequency. Denies any recent travel. Denies any known ill contacts. ED work-up: Chest x-ray indicating bilateral lower lobe pneumonia. Hypoxia with exertion 89%, required 2 L oxygen nasal cannula, WBC 12.9, lactic acid 2.8. Patient was started on Rocephin and azithromycin. Covid testing negative    On my exam the patient is awake and alert. Oxygen 2 L nasal cannula in place saturating 95%. Heart rate mid 80s. Blood pressure stable. She offers no complaints. Lung fields decreased bilateral bases without evidence of wheezing rales or rhonchi that I can appreciate. She has some chronic right lower leg edema 2+ and some chronic mild left lower leg edema 1+.   Patient reports that compression stockings help but she is unable to get them on and when she gets them on she has a very difficult time getting them off. Lives at home with spouse  CODE STATUS full    Past Medical History:        Diagnosis Date    Arthritis     Atrial fibrillation, transient ([address]     Transient ischemic attack (TIA)        Past Surgical History:        Procedure Laterality Date    ABDOMEN SURGERY      uterine CA historectomy    CHOLECYSTECTOMY      JOINT REPLACEMENT  05/2018    Right knee       Medications Prior to Admission:    Prior to Admission medications    Medication Sig Start Date End Date Taking? Authorizing Provider   amiodarone (CORDARONE) 200 MG tablet Take 100 mg by mouth daily   Yes Historical Provider, MD   apixaban (ELIQUIS) 5 MG TABS tablet Take 5 mg by mouth 2 times daily   Yes Historical Provider, MD   lisinopril (PRINIVIL;ZESTRIL) 20 MG tablet Take 2 tablets by mouth daily  Patient taking differently: Take 20 mg by mouth daily  3/14/19  Yes Yulia Tipton MD   CARTIA  MG extended release capsule Take 120 mg by mouth daily 2/13/19  Yes Historical Provider, MD   B Complex Vitamins (VITAMIN B COMPLEX PO) Take 1 tablet by mouth daily   Yes Historical Provider, MD   hydrochlorothiazide (HYDRODIURIL) 25 MG tablet Take 25 mg by mouth daily   Yes Historical Provider, MD   Cholecalciferol (VITAMIN D) 2000 units CAPS capsule Take 2,000 Units by mouth daily    Historical Provider, MD       Allergies:  Sulfa antibiotics    Social History:  The patient currently lives at home. TOBACCO:   reports that she has never smoked. She has never used smokeless tobacco.  ETOH:   reports previous alcohol use. Family History:  Reviewed in detail and negative for DM, Early CAD, Cancer, CVA. Positive as follows:        Problem Relation Age of Onset    Stroke Mother     Diabetes Mother     Diabetes Father        REVIEW OF SYSTEMS:   Positive for cough, fatigue, shortness of breath and as noted in the HPI. All other systems reviewed and negative.     PHYSICAL EXAM:    /63   Pulse 80   Temp 99.1 °F (37.3 °C) (Oral)   Resp 18   Ht 5' 3\" (1.6 m)   Wt 164 lb 10.9 oz (74.7 kg)   SpO2 91%   BMI 29.17 kg/m²     General appearance: No apparent distress appears stated age and cooperative. HEENT Normal cephalic, atraumatic without obvious deformity. Pupils equal, round, and reactive to light. Extra ocular muscles intact. Conjunctivae/corneas clear. Neck: Supple, No jugular venous distention/bruits. Trachea midline without thyromegaly or adenopathy with full range of motion. Lungs: Easy regular nonlabored. No wheezing rales or rhonchi. Decreased breath sounds bilateral bases. Oxygen therapy 2 L nasal cannula in place. Pulse oximetry 95% with O2 therapy   heart: Regular rate and rhythm with Normal S1/S2 without murmurs, rubs or gallops, point of maximum impulse non-displaced  Abdomen: Soft, non-tender or non-distended without rigidity or guarding and positive bowel sounds all four quadrants. Extremities: As noted above  Skin: Skin color, texture, turgor normal.  No rashes or lesions. Neurologic: Alert and oriented X 3, neurovascularly intact with sensory/motor intact upper extremities/lower extremities, bilaterally. Cranial nerves: II-XII intact, grossly non-focal.  Mental status: Alert, oriented, thought content appropriate.   Capillary Refill: Acceptable  < 3 seconds  Peripheral Pulses: +3 Easily felt, not easily obliterated with pressure      CXR:  I have reviewed the CXR with the following interpretation: Cardiomegaly pulmonary edema, hazy parenchymal bilateral opacities  EKG:  I have reviewed the EKG with the following interpretation:   Sinus rhythm rate 86, WI interval 80, QRS 88,     CBC   Recent Labs     08/11/21 2007   WBC 12.9*   HGB 12.4   HCT 36.5         RENAL  Recent Labs     08/11/21 2007   *   K 3.6   CL 95*   CO2 25   BUN 18   CREATININE 1.3*     LFT'S  No results for input(s): AST, ALT, ALB, BILIDIR, BILITOT, ALKPHOS in the last 72 hours. COAG  No results for input(s): INR in the last 72 hours. CARDIAC ENZYMES  Recent Labs     08/11/21 2007   TROPONINI 0.01       U/A:  No results found for: NITRITE, COLORU, WBCUA, RBCUA, MUCUS, BACTERIA, CLARITYU, SPECGRAV, LEUKOCYTESUR, BLOODU, GLUCOSEU, AMORPHOUS    ABG  No results found for: UYO4DMY, BEART, V7IAZZVZ, PHART, THGBART, TZX5WMV, PO2ART, SWB2HEC        Active Hospital Problems    Diagnosis Date Noted    Acute respiratory failure with hypoxia ([facility] [address] [J96.01] 08/12/2021    Community acquired bacterial pneumonia [J15.9] 08/12/2021    Sepsis ([facility] [address] [A41.9] 08/11/2021         PHYSICIANS CERTIFICATION:    I certify that [facility] is expected to be hospitalized for more than 2 midnights based on the following assessment and plan:      ASSESSMENT/PLAN:    Acute respiratory failure with hypoxia:2/2-> : CAP  Covid negative, EKG negative for evidence of STEMI, BNP, troponin within normal limit. No evidence of pulmonary edema or HF  Covid vaccinated  Not oxygen dependent at home  Requiring oxygen therapy 2 L to maintain saturation 95%  Continue Rocephin and azithromycin  Oxygen therapy  Tessalon Perle  DuoNeb as needed  MRSA nasal, Legionella, strep pneumoniae in a.m. Sepsis POA:2/2-> CAP  Leukocytosis: 12.9  Lactic acidosis 2.8=> 1.6->1.2  Patient received 30 mL/kg IVF , continuous IVF LR  Strict I &O  BC x2 in process  Procalcitonin in a.m. Continue Rocephin & Azithromycin    Mild PIETER: BUN 18, creatinine 1.3, GFR 39  2011 and 2019 renal function did not have evidence of CKD  Hold lisinopril  Strict I &O  Recheck lab in a.m. History of paroxysmal atrial fib: Continue anticoagulation therapy: Eliquis    Hypertension: Continue amiodarone,and Cardizem per home regimen      DVT Prophylaxis: Eliquis  Diet: ADULT DIET;  Regular  Code Status: Full Code  PT/OT Eval Status: independent    Dispo - admit, inpt       Yissel Alba Ou, APRN - CNP    Thank you Marni Heller for the opportunity to be involved in this patient's care. If you have any questions or concerns please feel free to contact me at [phone].

## 2021-08-12 NOTE — PLAN OF CARE
Problem: Falls - Risk of:  Goal: Will remain free from falls  Description: Will remain free from falls  8/12/2021 1044 by Jignesh Pack RN  Outcome: Ongoing  8/12/2021 0350 by Beck Iniguez RN  Outcome: Ongoing  Goal: Absence of physical injury  Description: Absence of physical injury  8/12/2021 1044 by Jignesh Pack RN  Outcome: Ongoing  8/12/2021 0350 by Beck Iniguez RN  Outcome: Ongoing

## 2021-08-12 NOTE — PROGRESS NOTES
4 Eyes Skin Assessment     NAME:  Sharlene Doss  YOB: 1932  MEDICAL RECORD NUMBER:  [id number]    The patient is being assess for  Admission    I agree that 2 RN's have performed a thorough Head to Toe Skin Assessment on the patient. ALL assessment sites listed below have been assessed. Areas assessed by both nurses:    Head, Face, Ears, Shoulders, Back, Chest, Arms, Elbows, Hands, Sacrum. Buttock, Coccyx, Ischium and Legs. Feet and Heels        Does the Patient have a Wound?  No noted wound(s)       Lenny Prevention initiated:  NA   Wound Care Orders initiated:  NA    Pressure Injury (Stage 3,4, Unstageable, DTI, NWPT, and Complex wounds) if present place consult order under [de-identified] NA    New and Established Ostomies if present place consult order under : No      Nurse 1 eSignature: Electronically signed by Linda Dempsey RN on 8/12/21 at 4:34 AM EDT    **SHARE this note so that the co-signing nurse is able to place an eSignature**    Nurse 2 eSignature: Electronically signed by Jazzmine Chavez RN on 8/12/21 at 9:33 AM EDT

## 2021-08-12 NOTE — PROGRESS NOTES
Physical Therapy    Facility/Department: [address] MED SURG  Initial Assessment/Treatment Session    NAME: Ariana Horowitz  : 1932  MRN: [id number]    Date of Service: 2021    Discharge Recommendations:  Patient would benefit from continued therapy after discharge, 3-5 sessions per week, Continue to assess pending progress, 24 hour supervision or assist, Home with Home health PT, S Level 1   PT Equipment Recommendations  Equipment Needed: No    Assessment   Body structures, Functions, Activity limitations: Decreased functional mobility ; Decreased endurance;Decreased balance  Assessment: Pt is an 80 y.o. F. admitted  for respiratory failure, sepsis, PIETER. She is very [place], but is pleasant and agreeable to evaluation. She is currently requiring 3 L. O2 (does not use at baseline), and experienced significant fatigue ambulating short distances in her room today. She would benefit from continued therapy to improve her endurance, strength, and independence ambulating. Recommend either a short bout of low-moderate frequency therapy, or return home with initial 24/7 assist, home PT level 1, and regular use of RW. Ariana Horowitz scored a 17/24 on the AM-PAC short mobility form. Current research shows that an AM-PAC score of 17 or less is typically not associated with a discharge to the patient's home setting. Based on the patient's AM-PAC score and their current functional mobility deficits, it is recommended that the patient have 3-5 sessions per week of Physical Therapy at d/c to increase the patient's independence. Please see assessment section for further patient specific details. If patient discharges prior to next session this note will serve as a discharge summary. Please see below for the latest assessment towards goals.      Specific instructions for Next Treatment: Improve strength, balance, and endurance  Prognosis: Good  Decision Making: Medium Complexity  History: See below  Exam: Strength; ROM; urinary frequency. Denies any recent travel. Denies any known ill contacts. \"  Response To Previous Treatment: Not applicable  Referring Practitioner: Dr. Duane Dior  Referral Date : 08/12/21  Diagnosis: PIETER; Sepsis; Acute Respiratory Failure  Follows Commands: Within Functional Limits  Subjective  Subjective: Pt is [place], but pleasant and agreeable to evaluation. Son present, able to assist providing social hx. Pain Screening  Patient Currently in Pain: No    Orientation  Orientation  Overall Orientation Status: Within Functional Limits     Social/Functional History  Social/Functional History  Lives With: Alone  Type of Home: House  Home Layout: Two level, Able to Live on Main level with bedroom/bathroom  Home Access: Stairs to enter without rails  Entrance Stairs - Number of Steps: 2  Bathroom Shower/Tub: Tub/Shower unit (Tub shower with cut-out door)  Bathroom Toilet: Standard  Bathroom Equipment: Built-in shower seat  Home Equipment: Rolling walker  ADL Assistance: Needs assistance ((I) with dressing and toileting. Needs supervision for showering.)  Homemaking Assistance:  (Family assists.)  Ambulation Assistance: Independent (mod I with RW)  Transfer Assistance: Independent  Active : No  Patient's  Info: Family transports pt  Additional Comments: Pt's son and daughter check in on her on a daily basis. They assist with homemaking tasks and shopping as needed.     Objective    AROM RLE (degrees)  RLE AROM: WFL  RLE General AROM: Hip Flex, Knee Flex/Ext, and Ankle PF/DF WFL  AROM LLE (degrees)  LLE AROM : WFL  LLE General AROM: Hip Flex, Knee Flex/Ext, and Ankle PF/DF WFL  AROM RUE (degrees)  RUE AROM : WFL  RUE General AROM: Shoulder Flex, Elbow Flex/ext WFL  AROM LUE (degrees)  LUE AROM : WFL  LUE General AROM: Shoulder Flex, Elbow Flex/ext WFL     Strength RLE  Strength RLE: WFL  Comment: Hip Flex, knee Flex/Ext and Ankle PF/DF grossly 3+ to 4/5  Strength LLE  Strength LLE: WFL  Comment: Hip Flex, knee Flex/Ext and Ankle PF/DF grossly 3+ to 4/5  Strength RUE  Strength RUE: WFL  Comment: Shoulder Flex, Elbow Flex/Ext WFL  Strength LUE  Strength LUE: WFL  Comment: Shoulder Flex, Elbow Flex/Ext WFL     Tone RLE  RLE Tone: Normotonic  Tone LLE  LLE Tone: Normotonic  Motor Control  Gross Motor?: WFL     Transfers  Sit to Stand: Contact guard assistance  Stand to sit: Contact guard assistance     Ambulation  Ambulation?: Yes  Ambulation 1  Surface: level tile  Device: Rolling Walker  Assistance: Minimal assistance  Quality of Gait: Using RW, pt quickly became fatigued, requiring seated rest after short-distance ambulation. Distance: 20' x 2  Comments: Progressive SOB     Balance  Comments: Pt able to urinate, complete pericare, and wash hands with CGA. After seated rest, she was able to doff underwear, and don new brief with Min A. Plan   Plan  Times per week: 2-3x  Specific instructions for Next Treatment: Improve strength, balance, and endurance  Current Treatment Recommendations: Strengthening, Balance Training, Endurance Training, Functional Mobility Training, Transfer Training, Gait Training, Stair training, Positioning, Equipment Evaluation, Education, & procurement, Patient/Caregiver Education & Training, Safety Education & Training, Home Exercise Program, Neuromuscular Re-education  Safety Devices  Type of devices: All fall risk precautions in place, Call light within reach, Chair alarm in place, Gait belt, Left in chair, Nurse notified  Restraints  Initially in place: No    AM-PAC Score  AM-PAC Inpatient Mobility Raw Score : 17 (08/12/21 1417)  AM-PAC Inpatient T-Scale Score : 42.13 (08/12/21 1417)  Mobility Inpatient CMS 0-100% Score: 50.57 (08/12/21 1417)  Mobility Inpatient CMS G-Code Modifier : CK (08/12/21 1417)          Goals  Short term goals  Time Frame for Short term goals:  In 2-3 days pt will perform  Short term goal 1: Bed mobility (I)  Short term goal 2: Transfers SBA  Short term goal 3: Ambulation 48' with walker, CGA  Long term goals  Time Frame for Long term goals : LTG = STG  Patient Goals   Patient goals : to improve her endurance before returning home       Therapy Time   Individual Concurrent Group Co-treatment   Time In 1325         Time Out 1405         Minutes 40         Timed Code Treatment Minutes: 25 Minutes   Evaluation time: 15 min.      Osman Mclean, PT    Electronically signed by Osman Mclean, PT [id number] on 8/12/2021 at 2:25 PM

## 2021-08-12 NOTE — PROGRESS NOTES
Medication Reconciliation     List of medications patient is currently taking is complete. Source of information:   1. Conversation with patient at bedside  2. EPIC records        Notes regarding home medications:  1. Patient received all of her home medications today.   2. Still reports needing dose 2/2 of eliquis  Denies other otc or herbal medications  Jaja Rothman, Pharmacy Intern 8/11/2021 9:56 PM

## 2021-08-12 NOTE — CARE COORDINATION
DISCHARGE PLAN: Pt is hopeful to return home upon d/c. Will await PT/OT evaluations for further d/c recommendations. Family will transport pt home.   ___________________________________    Met w/pt to address barriers to dc. HOME: Pt reported that she resides in a single family home alone. Pt stays on the main level of the home and reported that there are 2 CORIE. COVID Vaccination: Yes    DME/O2: Walker and cane. Pt stated that she is no longer able to get in the shower to bathes. Pt sponge bathes. No DME needs noted at this time. SW will follow for PT/OT recommendations. ACTIVE SERVICES: Pt stated that she was not active with any formal services PTA. Pt reported that her dgtr assists with the general cleaning and laundry. Pt reported that she was independent with self care PTA but is unsure if she will need assistance after this hospitalization. TRANSPORTATION: Pt does not drive and stated that her son and dgtr assist with providing transport wherever she needs to go. Pt stated that her family will transport her home at d/c. PHARMACY: Denies difficulty obtaining/taking meds. Pt has all meds filled at [address] [facility] on [place]. PCP: Mehdi Lopez saw on 8/11/2021    DEMOGRAPHICS: Verified address/phone number as correct    INSURANCE:  Medicare/[facility]  PRESCRIPTION COVERAGE: Medicare Part D    HD/PD: No    THERAPY RECS Ordered-Will await recommendations. PHYSICAL THERAPY    OCCUPATIONAL THERAPY    Discharge planning team will remain available for needs. Please consult for any specifics not addressed in this note.     Justen Jurado, [facility]  [phone]  Electronically signed by Julian Blount on 8/12/2021 at 10:59 AM

## 2021-08-13 NOTE — CARE COORDINATION
Went back to talk to pt re: [facility] options & give [facility] the [facility] but pt said she wants to go to [facility]. Called Terrence Cates at [facility] & left a vm with my return number.     Electronically signed by Hammad Ware RN on 8/13/21 at 11:25 AM EDT

## 2021-08-13 NOTE — PROGRESS NOTES
Occupational Therapy   Occupational Therapy Initial Assessment and Tentative D/C    Date: 2021   Patient Name: Brian Rose  MRN: [id number]     : 1932    Date of Service: 2021    Discharge Recommendations: Brian Rose scored a 16/24 on the AM-PAC ADL Inpatient form. Current research shows that an AM-PAC score of 17 or less is typically not associated with a discharge to the patient's home setting. Based on the patient's AM-PAC score and their current ADL deficits, it is recommended that the patient have 3-5 sessions per week of Occupational Therapy at d/c to increase the patient's independence. Please see assessment section for further patient specific details. If patient discharges prior to next session this note will serve as a discharge summary. Please see below for the latest assessment towards goals. Continue to assess pending progress, 3-5 sessions per week, Patient would benefit from continued therapy after discharge  OT Equipment Recommendations  Equipment Needed: No    Assessment   Performance deficits / Impairments: Decreased strength;Decreased functional mobility ; Decreased ADL status; Decreased balance;Decreased endurance;Decreased high-level IADLs  Assessment: Brian Rose is a 80 y.o. female who  has a past medical history of Arthritis, Atrial fibrillation, transient ([address] and Transient ischemic attack (TIA). presents to the ED complaining of shortness of breath and cough over the past 4 days. Patient was seen by her PCP 2 days ago and diagnosed with pneumonia. Patient states she was instructed to come to the emergency room for admission at that time but did not want to. Patient states she continued to have shortness of breath and felt generally fatigued and therefore came to the emergency room today. Patient has been coughing but denies any sputum production with her cough. Denies any headache or body aches. States she has had some nausea but denies any vomiting. Denies any chest pain. Denies any home oxygen use at baseline. Normal urinary and bowel habits. Denies any known sick contacts. Has been vaccinated for COVID-19. No other complaints, modifying factors or associated symptoms. PTA pt from home alone where pt was Ind with mobility and ADLs. Pt currently requires CGA and use of RW for mobility and transfers. Pt on 8L O2 and ambulated ~15ft. Pt's SpO2 decreasing into upper 60s. O2 increased to 10L and returning to mid 80s with significant increased time. RN notified. Anticipate pt needing up to Mod A for ADLs. Pt primarily limited due to decreased endurance and increased O2 demand. Pt will benefit from skilled OT services at this time. Anticipate pt with need for ongoing OT at time of D/C. Prognosis: Good  Decision Making: Medium Complexity  Exam: see above  Assistance / Modification: RW  OT Education: Plan of Care;OT Role;Transfer Training;ADL Adaptive Strategies  REQUIRES OT FOLLOW UP: Yes  Activity Tolerance  Activity Tolerance: Treatment limited secondary to medical complications (free text); Patient limited by fatigue  Activity Tolerance: increased O2 demand  Safety Devices  Safety Devices in place: Yes  Type of devices: Chair alarm in place;Call light within reach;Nurse notified; Left in chair;Gait belt           Patient Diagnosis(es): The primary encounter diagnosis was Community acquired bilateral lower lobe pneumonia. Diagnoses of Acute respiratory failure with hypoxia (HCC) and PIETER (acute kidney injury) ([address] were also pertinent to this visit. has a past medical history of Arthritis, Atrial fibrillation, transient ([address] and Transient ischemic attack (TIA). has a past surgical history that includes Cholecystectomy; joint replacement (05/2018); and Abdomen surgery.            Restrictions  Restrictions/Precautions  Restrictions/Precautions: Fall Risk  Position Activity Restriction  Other position/activity restrictions: 8-10L O2 nc; does not use O2 at baseline    Subjective   General  Chart Reviewed: Yes  Patient assessed for rehabilitation services?: Yes  Additional Pertinent Hx: per MD note, Debbie Garcia is a 80 y.o. female who  has a past medical history of Arthritis, Atrial fibrillation, transient ([address] and Transient ischemic attack (TIA). presents to the ED complaining of shortness of breath and cough over the past 4 days. Patient was seen by her PCP 2 days ago and diagnosed with pneumonia. Patient states she was instructed to come to the emergency room for admission at that time but did not want to. Patient states she continued to have shortness of breath and felt generally fatigued and therefore came to the emergency room today. Patient has been coughing but denies any sputum production with her cough. Denies any headache or body aches. States she has had some nausea but denies any vomiting. Denies any chest pain. Denies any home oxygen use at baseline. Normal urinary and bowel habits. Denies any known sick contacts. Has been vaccinated for COVID-19. No other complaints, modifying factors or associated symptoms. Family / Caregiver Present: No  Referring Practitioner: Klaus Rivera MD  Subjective  Subjective: Pt agreeable to OT evalaution. Pt reports no pain.  Pt on 8L O2 with SpO2 88% seated at rest.  Vital Signs  Temp: 97.8 °F (36.6 °C)  Temp Source: Oral  Pulse: 80  Heart Rate Source: Monitor  Resp: 18  BP: (!) 120/54  BP Location: Right Arm  MAP (mmHg): 76  Oxygen Therapy  SpO2: 90 %  O2 Device: Nasal cannula  Social/Functional History  Social/Functional History  Lives With: Alone  Type of Home: House  Home Layout: Two level, Able to Live on Main level with bedroom/bathroom  Home Access: Stairs to enter without rails  Entrance Stairs - Number of Steps: 2  Bathroom Shower/Tub: Tub/Shower unit (Tub shower with cut-out door)  Bathroom Toilet: Standard  Bathroom Equipment: Built-in shower seat  Home Equipment: Rolling walker  ADL Assistance: Needs assistance ((I) with dressing and toileting. Needs supervision for showering.)  Homemaking Assistance:  (Family assists.)  Ambulation Assistance: Independent (mod I with RW)  Transfer Assistance: Independent  Active : No  Patient's  Info: Family transports pt  Additional Comments: Pt's son and daughter check in on her on a daily basis. They assist with homemaking tasks and shopping as needed. Objective   Vision: Impaired  Vision Exceptions: Wears glasses for reading  Hearing: Exceptions to [facility]  Hearing Exceptions: Hard of hearing/hearing concerns (Has hearing aides, but does not use them.)    Orientation  Overall Orientation Status: Within Functional Limits     Balance  Sitting Balance: Supervision  Standing Balance: Contact guard assistance (RW)  Functional Mobility  Functional - Mobility Device: Rolling Walker  Activity: To/from bathroom (~15ft x2)  Assist Level: Contact guard assistance  Functional Mobility Comments: no LOB; pt's SpO2 decreasing into upper 60s, pt needing extended seated rest break and increase to 10L O2 to return to mid 80s  Toilet Transfers  Toilet - Technique: Ambulating (RW)  Equipment Used: Grab bars  Toilet Transfer: Contact guard assistance  Wheelchair Bed Transfers  Wheelchair/Bed - Technique: Ambulating (RW)  Equipment Used: Other (chair)  Level of Asssistance: Contact guard assistance  ADL  UE Bathing: Setup (seated to wash hands)  Toileting: Minimal assistance; Moderate assistance (pt able to complete pericare and brief management; assist for quality with pericare; limited due to increased SOB)  Additional Comments: Anticipate pt needing up to Mod A for ADLs including dressing, bathing, and toileting based on ROM, strength, balance, and endurance  Tone RUE  RUE Tone: Normotonic  Tone LUE  LUE Tone: Normotonic  Coordination  Movements Are Fluid And Coordinated: Yes     Bed mobility  Supine to Sit: Unable to assess  Sit to Supine: Unable to assess  Scooting: Unable to assess  Transfers  Sit to stand: Contact guard assistance  Stand to sit: Contact guard assistance  Transfer Comments: to/from RW     Cognition  Overall Cognitive Status: WFL                 LUE AROM (degrees)  LUE AROM : WFL  RUE AROM (degrees)  RUE AROM : WFL  LUE Strength  Gross LUE Strength: WFL  RUE Strength  Gross RUE Strength: WFL           Plan   Plan  Times per week: 3-5x  Current Treatment Recommendations: Functional Mobility Training, Strengthening, Endurance Training, Self-Care / ADL, Safety Education & Training, Balance Training, Patient/Caregiver Education & Training      AM-PAC Score        AM-[facility] Inpatient Daily Activity Raw Score: 16 (08/13/21 1454)  AM-PAC Inpatient ADL T-Scale Score : 35.96 (08/13/21 1454)  ADL Inpatient CMS 0-100% Score: 53.32 (08/13/21 1454)  ADL Inpatient CMS G-Code Modifier : CK (08/13/21 1454)    Goals  Short term goals  Time Frame for Short term goals: prior to D/C  Short term goal 1: complete functional mobility and transfers Mod Ind  Short term goal 2: complete toileting Mod Ind  Short term goal 3: complete bathing and dressing Mod Ind  Short term goal 4: complete grooming in stance at sink [address] term goals  Time Frame for Long term goals : STG=LTG  Patient Goals   Patient goals : increase breathing       Therapy Time   Individual Concurrent Group Co-treatment   Time In 1406         Time Out 1452         Minutes 46         Timed Code Treatment Minutes: 31 Minutes (15 minute eval)       KAJAL Lopez/LUCA

## 2021-08-13 NOTE — PROGRESS NOTES
Pt 02 sat 80% on 10 L. O2 increased to 15L and a non rebreather applied. Respiratory notified. Dr Kevin Todd notified. 02 sat increased to 93%.  Electronically signed by Bishop Peterson RN on 8/13/2021 at 6:07 PM

## 2021-08-13 NOTE — PROGRESS NOTES
Patients oxygen saturation is at 90% on 10 Liters HF NC. Patient still states SOB. Message sent to MD Doraine Landau, new orders placed to transfer patient PCU. Family called and updated. Call light and belongings are within reach, will continue to monitor.

## 2021-08-13 NOTE — PROGRESS NOTES
Hospitalist Progress Note      PCP: TOÑO Ritter - CNP    Date of Admission: 8/11/2021    Chief Complaint: hypoxia    Hospital Course:    Subjective: acutely worsened hypoxia progressive from 3 to 15L. CT chest appears to be ARDS. Updated code discussion with patient and son is that patient wants rescucitation but not be on a ventilator      Medications:  Reviewed    Infusion Medications    sodium chloride 25 mL (08/12/21 2119)     Scheduled Medications    amiodarone  100 mg Oral Daily    apixaban  5 mg Oral BID    furosemide  20 mg Intravenous BID    ciprofloxacin  1 drop Both Eyes 2 times per day    dilTIAZem  120 mg Oral Daily    [Held by provider] lisinopril  40 mg Oral Daily    sodium chloride flush  5-40 mL Intravenous 2 times per day    azithromycin  500 mg Intravenous Q24H    cefTRIAXone (ROCEPHIN) IV  1,000 mg Intravenous Q24H     PRN Meds: sodium chloride flush, sodium chloride, acetaminophen **OR** acetaminophen, benzonatate, ipratropium-albuterol      Intake/Output Summary (Last 24 hours) at 8/13/2021 0846  Last data filed at 8/12/2021 1829  Gross per 24 hour   Intake 360 ml   Output    Net 360 ml       Exam:    BP (!) 132/54   Pulse 62   Temp 98.7 °F (37.1 °C) (Oral)   Resp 18   Ht 5' 3\" (1.6 m)   Wt 164 lb 10.9 oz (74.7 kg)   SpO2 (!) 87%   BMI 29.17 kg/m²     General appearance: No apparent distress, appears stated age and cooperative. HEENT: Pupils equal, round, and reactive to light. Conjunctivae/corneas clear. Neck: Supple, with full range of motion. No jugular venous distention. Trachea midline. Respiratory:  Normal respiratory effort. Clear to auscultation, bilaterally without Rales/Wheezes/Rhonchi. Cardiovascular: Regular rate and rhythm with normal S1/S2 without murmurs, rubs or gallops. Abdomen: Soft, non-tender, non-distended with normal bowel sounds. Musculoskeletal: No clubbing, cyanosis or edema bilaterally.   Full range of motion without deformity. Skin: Skin color, texture, turgor normal.  No rashes or lesions. Neurologic:  Neurovascularly intact without any focal sensory/motor deficits. Cranial nerves: II-XII intact, grossly non-focal.  Psychiatric: Alert and oriented, thought content appropriate, normal insight  Capillary Refill: Brisk,< 3 seconds   Peripheral Pulses: +2 palpable, equal bilaterally       Labs:   Recent Labs     08/11/21 2007 08/12/21  0735 08/13/21  0643   WBC 12.9* 13.2* 17.0*   HGB 12.4 12.0 12.2   HCT 36.5 35.1* 36.2    346 367     Recent Labs     08/11/21 2007 08/12/21  0735 08/13/21  0643   * 137 135*   K 3.6 3.6 3.6   CL 95* 100 96*   CO2 25 28 28   BUN 18 13 17   CREATININE 1.3* 1.0 1.1   CALCIUM 9.0 8.9 8.9     No results for input(s): AST, ALT, BILIDIR, BILITOT, ALKPHOS in the last 72 hours. Recent Labs     08/12/21 0735 08/13/21  0643   INR 1.93* 2.15*     Recent Labs     08/11/21 2007   TROPONINI 0.01       Urinalysis:    No results found for: [facility], BACTERIA, RBCUA, BLOODU, SPECGRAV, GLUCOSEU    Radiology:  XR CHEST (2 VW)   Final Result   Mild cardiomegaly and mild pulmonary edema which is more prominent. Hazy parenchymal opacities scattered in both lungs which could be due to   pulmonary edema and/or associated developing pneumonia. Small bibasilar pleural effusions.       RECOMMENDATION:   Mild         CT CHEST WO CONTRAST    (Results Pending)           Assessment/Plan:    Active Hospital Problems    Diagnosis Date Noted    Acute respiratory failure with hypoxia ([facility] [J96.01] 08/12/2021    Community acquired bacterial pneumonia [J15.9] 08/12/2021    Sepsis ([facility] [A41.9] 08/11/2021     Hypoxia:  Worsened  - appears to be ARDS on CT chest  - antibiotics broaded  - lasix increased  - decadron started    Sepsis:  - treat pneumonia  - f/u blood cultures    Pneumonia:  - CAP antibiotics    PIETER:  - holding lisinopril    H/o paroxysmal A fib:  Cont Eliquis, amiodarone  HTN:  Cont home medications    DVT Prophylaxis: Eliquis  Diet: ADULT DIET; Regular  Code Status: Full code after discussion with patient and son.   Does not want extended ventilator use, does not want tracheostomy    PT/OT Eval Status: [facility]    Dispo - Sherrilee Duverney, MD

## 2021-08-13 NOTE — PROGRESS NOTES
Pt arrived to room [facility]. Switched tele monitors to #60 to have continuous pulse ox. Pt on 10 L HF NC. Pt orientated to call light and room.  Electronically signed by Mehrdad Sawyer RN on 8/13/2021 at 4:15 PM

## 2021-08-14 NOTE — PROGRESS NOTES
Hospitalist Progress Note      PCP: Sadia Hennessy, APRN - CNP    Date of Admission: 8/11/2021    Chief Complaint: hypoxia    Hospital Course:    Subjective: hypoxia worsened to HFNC. Mentation ok      Medications:  Reviewed    Infusion Medications    sodium chloride 100 mL/hr at 08/14/21 0456     Scheduled Medications    cefepime  2,000 mg Intravenous Q12H    furosemide  40 mg Intravenous BID    dexamethasone  10 mg Intravenous Q24H    lactobacillus  1 capsule Oral Daily with breakfast    pantoprazole  40 mg Oral QAM AC    vancomycin (VANCOCIN) intermittent dosing (placeholder)   Other RX Placeholder    vancomycin  1,000 mg Intravenous Q24H    amiodarone  100 mg Oral Daily    apixaban  5 mg Oral BID    ciprofloxacin  1 drop Both Eyes 2 times per day    dilTIAZem  120 mg Oral Daily    [Held by provider] lisinopril  40 mg Oral Daily    sodium chloride flush  5-40 mL Intravenous 2 times per day    azithromycin  500 mg Intravenous Q24H     PRN Meds: sodium chloride flush, sodium chloride, acetaminophen **OR** acetaminophen, benzonatate, ipratropium-albuterol      Intake/Output Summary (Last 24 hours) at 8/14/2021 1620  Last data filed at 8/14/2021 1538  Gross per 24 hour   Intake 830 ml   Output    Net 830 ml       Exam:    /67   Pulse 73   Temp 98.6 °F (37 °C) (Oral)   Resp 20   Ht 5' 3\" (1.6 m)   Wt 168 lb 10.4 oz (76.5 kg)   SpO2 91%   BMI 29.88 kg/m²     General appearance: No apparent distress, appears stated age and cooperative. HEENT: Pupils equal, round, and reactive to light. Conjunctivae/corneas clear. Neck: Supple, with full range of motion. No jugular venous distention. Trachea midline. Respiratory:  Normal respiratory effort. Clear to auscultation, bilaterally without Rales/Wheezes/Rhonchi. Cardiovascular: Regular rate and rhythm with normal S1/S2 without murmurs, rubs or gallops. Abdomen: Soft, non-tender, non-distended with normal bowel sounds.   Musculoskeletal: No clubbing, cyanosis or edema bilaterally. Full range of motion without deformity. Skin: Skin color, texture, turgor normal.  No rashes or lesions. Neurologic:  Neurovascularly intact without any focal sensory/motor deficits. Cranial nerves: II-XII intact, grossly non-focal.  Psychiatric: Alert and oriented, thought content appropriate, normal insight  Capillary Refill: Brisk,< 3 seconds   Peripheral Pulses: +2 palpable, equal bilaterally       Labs:   Recent Labs     08/13/21  0643 08/13/21  1511 08/14/21  0623   WBC 17.0* 18.1* 14.6*   HGB 12.2 11.9* 11.7*   HCT 36.2 34.8* 34.6*    351 303     Recent Labs     08/13/21  0643 08/13/21  1511 08/14/21  0623   * 136 137   K 3.6 3.3* 3.2*   CL 96* 96* 97*   CO2 28 24 29   BUN 17 23* 28*   CREATININE 1.1 1.6* 1.2   CALCIUM 8.9 9.0 8.7     Recent Labs     08/13/21  1511   AST 20   ALT 14   BILITOT 0.5   ALKPHOS 106     Recent Labs     08/12/21  0735 08/13/21  0643 08/14/21  0623   INR 1.93* 2.15* 2.18*     Recent Labs     08/11/21 2007 08/13/21  1510   TROPONINI 0.01 0.01       Urinalysis:    No results found for: Deward Goon, BACTERIA, RBCUA, BLOODU, SPECGRAV, GLUCOSEU    Radiology:  CT CHEST WO CONTRAST   Final Result   Large amount of diffuse bilateral pulmonary disease. Some of this appears to   be due to pulmonary edema, with associated left greater than right pleural   effusions. Superimposed multifocal pneumonia is likely present and may   either be viral/COVID pneumonia or bacterial pneumonia. This has   significantly increased over the past 2 days. XR CHEST (2 VW)   Final Result   Mild cardiomegaly and mild pulmonary edema which is more prominent. Hazy parenchymal opacities scattered in both lungs which could be due to   pulmonary edema and/or associated developing pneumonia. Small bibasilar pleural effusions.       RECOMMENDATION:   Mild                 Assessment/Plan:    Active Hospital Problems    Diagnosis Date Noted  Acute respiratory failure with hypoxia (HCC) [J96.01] 08/12/2021    Community acquired bacterial pneumonia [J15.9] 08/12/2021    Sepsis ([facility] [address] [A41.9] 08/11/2021     Hypoxia:  Worsened  - pulm consulted:  Likely pulmonary edema  - antibiotics broaded  - lasix increased  - decadron started  - Echo ordered    Sepsis:  - treat pneumonia  - f/u blood cultures    Pneumonia:  - CAP antibiotics    PIETER:  - holding lisinopril    H/o paroxysmal A fib:  Cont Eliquis, amiodarone  HTN:  Cont home medications    DVT Prophylaxis: Eliquis  Diet: ADULT DIET; Regular  Code Status: Full code after discussion with patient and son.   Does not want extended ventilator use, does not want tracheostomy    PT/OT Eval Status: SNF    Dispo - Myrna Andre MD

## 2021-08-14 NOTE — PLAN OF CARE
Problem: Falls - Risk of:  Goal: Will remain free from falls  Description: Will remain free from falls  Outcome: Ongoing  Goal: Absence of physical injury  Description: Absence of physical injury  Outcome: Ongoing     Problem: Breathing Pattern - Ineffective:  Goal: Ability to achieve and maintain a regular respiratory rate will improve  Description: Ability to achieve and maintain a regular respiratory rate will improve  Outcome: Ongoing     Problem: Airway Clearance - Ineffective  Goal: Achieve or maintain patent airway  Outcome: Ongoing     Problem: Gas Exchange - Impaired  Goal: Absence of hypoxia  Outcome: Ongoing  Goal: Promote optimal lung function  Outcome: Ongoing     Problem: Breathing Pattern - Ineffective  Goal: Ability to achieve and maintain a regular respiratory rate  Outcome: Ongoing     Problem:  Body Temperature -  Risk of, Imbalanced  Goal: Ability to maintain a body temperature within defined limits  Outcome: Ongoing  Goal: Will regain or maintain usual level of consciousness  Outcome: Ongoing  Goal: Complications related to the disease process, condition or treatment will be avoided or minimized  Outcome: Ongoing     Problem: Isolation Precautions - Risk of Spread of Infection  Goal: Prevent transmission of infection  Outcome: Ongoing     Problem: Nutrition Deficits  Goal: Optimize nutritional status  Outcome: Ongoing     Problem: Risk for Fluid Volume Deficit  Goal: Maintain normal heart rhythm  Outcome: Ongoing  Goal: Maintain absence of muscle cramping  Outcome: Ongoing  Goal: Maintain normal serum potassium, sodium, calcium, phosphorus, and pH  Outcome: Ongoing     Problem: Loneliness or Risk for Loneliness  Goal: Demonstrate positive use of time alone when socialization is not possible  Outcome: Ongoing     Problem: Fatigue  Goal: Verbalize increase energy and improved vitality  Outcome: Ongoing     Problem: Patient Education: Go to Patient Education Activity  Goal: Patient/Family Education  Outcome: Ongoing

## 2021-08-14 NOTE — CONSULTS
REASON FOR CONSULTATION/CC: Acute hypoxia      Consult at request of Ellen Paredes MD     PCP: TOÑO Ochoa - CNP  Established Pulmonologist:  None    Chief Complaint   Patient presents with    Cough     pt sent to ED wtih cough, shortness of breath for last 4 days. sent by PCP. HISTORY OF PRESENT ILLNESS: Codie Blue is a 80y.o. year old female with a history of never smoker, atrial fibrillation who presents with cough and shortness of breath. Symptoms have been present for less than a week, progressive in nature. Patient was initially admitted for mild hypoxemia over the course of the day yesterday had rapidly rising oxygen requirements now on Vapotherm. Her Covid testing was negative. She has a mild elevated pro-Chele.  Chest x-ray and CT scans suggestive for pulmonary edema. She responded moderately well to diuretics overnight. She has been started on empiric broad-spectrum antibiotics for possible aspiration pneumonia. Past Medical History:   Diagnosis Date    Arthritis     Atrial fibrillation, transient ([address]     Transient ischemic attack (TIA)          Past Surgical History:   Procedure Laterality Date    ABDOMEN SURGERY      uterine CA historectomy    CHOLECYSTECTOMY      JOINT REPLACEMENT  05/2018    Right knee       Family Hx  family history includes Diabetes in her father and mother; Stroke in her mother. Social Hx   reports that she has never smoked.  She has never used smokeless tobacco.    Scheduled Meds:   cefepime  2,000 mg Intravenous Q12H    furosemide  40 mg Intravenous BID    dexamethasone  10 mg Intravenous Q24H    lactobacillus  1 capsule Oral Daily with breakfast    pantoprazole  40 mg Oral QAM AC    vancomycin (VANCOCIN) intermittent dosing (placeholder)   Other RX Placeholder    vancomycin  1,000 mg Intravenous Q24H    amiodarone  100 mg Oral Daily    apixaban  5 mg Oral BID    ciprofloxacin  1 drop Both Eyes 2 times per day    dilTIAZem  120 mg 91.8    351 303     BMP:   Recent Labs     08/13/21  0643 08/13/21  1511 08/14/21  0623   * 136 137   K 3.6 3.3* 3.2*   CL 96* 96* 97*   CO2 28 24 29   BUN 17 23* 28*   CREATININE 1.1 1.6* 1.2     LIVER PROFILE:   Recent Labs     08/13/21  1511   AST 20   ALT 14   BILITOT 0.5   ALKPHOS 106     PT/INR:   Recent Labs     08/12/21  0735 08/13/21  0643 08/14/21  0623   PROTIME 22.4* 25.1* 25.5*   INR 1.93* 2.15* 2.18*     APTT: No results for input(s): APTT in the last 72 hours. UA:No results for input(s): NITRITE, COLORU, PHUR, LABCAST, WBCUA, RBCUA, MUCUS, TRICHOMONAS, YEAST, BACTERIA, CLARITYU, SPECGRAV, LEUKOCYTESUR, UROBILINOGEN, BILIRUBINUR, BLOODU, GLUCOSEU, AMORPHOUS in the last 72 hours. Invalid input(s): KETONESU  Recent Labs     08/13/21  1635   PHART 7.485*   SGO5BLQ 39.2   PO2ART 56.9*       Vent Information  Skin Assessment: Clean, dry, & intact  SpO2: 90 %    Radiology Review:  Pertinent images / reports were reviewed as a part of this visit. CT Chest images reviewed personally by me, interpretation as follows:  -Diffuse bilateral groundglass opacification of background and interlobular septal thickening suggestive of acute pulmonary edema. She also has bilateral pleural effusions corroborate fluid overload. Some nodular consolidation bilaterally as well.       EKG  Diagnosis  Normal sinus rhythmNonspecific ST abnormalityConfirmed by TANIYA JACOBS, Renan Gardiner ([id number]) on 8/12/2021 8:07:11 AM         Assessment/plan:     Acute hypoxic respiratory failure with SPO2 less than 90% room air  -Wean supplemental oxygen goal SPO2 90%    Abnormal CT of the lungs  -Likely multifactorial CT findings of edema and effusion 0.2 fluid overload, she also has a mildly elevated BNP the tracing.  -Agree with echocardiogram and diuretics empirically  -She also has many findings on characteristic of simple fluid overload, possible likely concomitant infectious process, cannot rule out developing ARDS.  -Cefepime/azithromycin/vancomycin  -MRSA nares negative can DC vancomycin  -Respiratory culture pending, strep/Legionella antigens negative  -Covid negative    On Eliquis     This note was transcribed using [id number] [facility]. Please disregard any translational errors.     Thank you for the consult    [address]  Pulmonary, Sleep and Critical Care Medicine

## 2021-08-14 NOTE — PROGRESS NOTES
Patient desats to low 80's when getting up to use bedside commode. She becomes more short of breath. She claims, \" I can't seem to catch my breath and keep this up. \" But when returned to bed, she says she is \"more comfortable. \" Electronically signed by Jarred Tejeda RN on 8/14/2021 at 7:02 AM

## 2021-08-14 NOTE — PROGRESS NOTES
Patient on 15L HFLC and 15L non-rebreather this morning. Patient was able to tolerate eating breakfast with only the 15L HFNC. After breakfast patient falling asleep and requiring 15L HFNC and 15L non-rebreather while asleep. Physician notified via secure message. Vapotherm ordered. RT notified.      Electronically signed by Sia Martin RN on 8/14/2021 at 11:20 AM

## 2021-08-14 NOTE — PROGRESS NOTES
Respiratory Therapy    O2 saturations progressively worsening throughout the day. 15L to 23L to 40L FIO2 100% SPO2 84%  Pt placed on BIPAP 10/5 14 FIO2 100%  Pt tolerating fairly  No distress noted  Cont.  Pulse oximetry in place    Electronically signed by Ja Mason RCP on 8/14/2021 at 4:56 PM left breast excisional biopsy at 11:00 4cmfn

## 2021-08-15 NOTE — PLAN OF CARE
Problem: Falls - Risk of:  Goal: Will remain free from falls  Description: Will remain free from falls  Outcome: Ongoing  Goal: Absence of physical injury  Description: Absence of physical injury  Outcome: Ongoing     Problem: Breathing Pattern - Ineffective:  Goal: Ability to achieve and maintain a regular respiratory rate will improve  Description: Ability to achieve and maintain a regular respiratory rate will improve  Outcome: Ongoing     Problem: Airway Clearance - Ineffective  Goal: Achieve or maintain patent airway  Outcome: Ongoing     Problem: Gas Exchange - Impaired  Goal: Absence of hypoxia  Outcome: Ongoing  Goal: Promote optimal lung function  Outcome: Ongoing     Problem: Breathing Pattern - Ineffective  Goal: Ability to achieve and maintain a regular respiratory rate  Outcome: Ongoing     Problem:  Body Temperature -  Risk of, Imbalanced  Goal: Ability to maintain a body temperature within defined limits  Outcome: Ongoing  Goal: Will regain or maintain usual level of consciousness  Outcome: Ongoing  Goal: Complications related to the disease process, condition or treatment will be avoided or minimized  Outcome: Ongoing     Problem: Isolation Precautions - Risk of Spread of Infection  Goal: Prevent transmission of infection  Outcome: Ongoing     Problem: Nutrition Deficits  Goal: Optimize nutritional status  Outcome: Ongoing     Problem: Risk for Fluid Volume Deficit  Goal: Maintain normal heart rhythm  Outcome: Ongoing  Goal: Maintain absence of muscle cramping  Outcome: Ongoing  Goal: Maintain normal serum potassium, sodium, calcium, phosphorus, and pH  Outcome: Ongoing     Problem: Loneliness or Risk for Loneliness  Goal: Demonstrate positive use of time alone when socialization is not possible  Outcome: Ongoing     Problem: Fatigue  Goal: Verbalize increase energy and improved vitality  Outcome: Ongoing     Problem: Patient Education: Go to Patient Education Activity  Goal: Patient/Family

## 2021-08-15 NOTE — PROGRESS NOTES
Pulmonary Progress Note    Date of Admission: 8/11/2021   LOS: 4 days     CC:  Chief Complaint   Patient presents with    Cough     pt sent to ED wtih cough, shortness of breath for last 4 days. sent by PCP. Assessment/Plan     Acute hypoxic respiratory failure with SPO2 less than 90% room air  -Wean supplemental oxygen goal SPO2 90%     Abnormal CT of the lungs  -Elevated BNP, CT findings consistent with fluid overload but has not responded well to diuresis, in addition echocardiogram within normal limits  -Likely developing ARDS, continue cefepime, pro-Chele only mildly elevated  -Check autoimmune, connective tissue disease/vasculitic markers,  -Fungal work-up  -PGP sputum     On Eliquis    Due to the immediate potential for life-threatening deterioration due to worsening hypoxemic respiratory failure, I spent 31 minutes providing critical care. This time is excluding time spent performing separately billable procedures. HPI/Subjective  Rising oxygen requirements overnight, placed on BiPAP this morning due to failure of airvo. ROS:   +dyspnea  No nausea  No chest pain      Intake/Output Summary (Last 24 hours) at 8/15/2021 0949  Last data filed at 8/15/2021 0552  Gross per 24 hour   Intake 2015.54 ml   Output 450 ml   Net 1565.54 ml         PHYSICAL EXAM:   Blood pressure (!) 138/49, pulse 68, temperature 97.5 °F (36.4 °C), temperature source Axillary, resp. rate 23, height 5' 3\" (1.6 m), weight 167 lb 15.9 oz (76.2 kg), SpO2 100 %.'  Gen:  No acute distress. Eyes: PERRL. Anicteric sclera. No conjunctival injection. ENT: No discharge. Posterior oropharynx clear. External appearance of ears and nose normal.  Neck: Trachea midline. No mass   Resp:  No crackles. No wheezes. No rhonchi. No dullness on percussion. CV: Regular rate. Regular rhythm. No murmur or rub. No edema. GI: Soft, Non-tender. Non-distended. +BS  Skin: Warm, dry, w/o erythema. Lymph: No cervical or supraclavicular LAD. M/S: No cyanosis. No clubbing. Neuro:  no focal neurologic deficit. Moves all extremities  Psych: Awake and alert, Oriented x 3. Judgement and insight appropriate. Mood stable. Medications:    Scheduled Meds:   cefepime  2,000 mg Intravenous Q12H    furosemide  40 mg Intravenous BID    dexamethasone  10 mg Intravenous Q24H    lactobacillus  1 capsule Oral Daily with breakfast    pantoprazole  40 mg Oral QAM AC    amiodarone  100 mg Oral Daily    apixaban  5 mg Oral BID    ciprofloxacin  1 drop Both Eyes 2 times per day    dilTIAZem  120 mg Oral Daily    [Held by provider] lisinopril  40 mg Oral Daily    sodium chloride flush  5-40 mL Intravenous 2 times per day       Continuous Infusions:   sodium chloride 100 mL/hr at 08/14/21 0456       PRN Meds:  sodium chloride flush, sodium chloride, acetaminophen **OR** acetaminophen, benzonatate, ipratropium-albuterol    Labs reviewed:  CBC:   Recent Labs     08/13/21  1511 08/14/21  0623 08/15/21  0526   WBC 18.1* 14.6* 18.8*   HGB 11.9* 11.7* 11.8*   HCT 34.8* 34.6* 35.4*   MCV 92.1 91.8 91.8    303 288     BMP:   Recent Labs     08/13/21  1511 08/14/21  0623 08/15/21  0526    137 135*   K 3.3* 3.2* 3.5   CL 96* 97* 97*   CO2 24 29 28   BUN 23* 28* 43*   CREATININE 1.6* 1.2 1.3*     LIVER PROFILE:   Recent Labs     08/13/21  1511   AST 20   ALT 14   BILITOT 0.5   ALKPHOS 106     PT/INR:   Recent Labs     08/13/21  0643 08/14/21  0623 08/15/21  0526   PROTIME 25.1* 25.5* 24.7*   INR 2.15* 2.18* 2.12*     APTT: No results for input(s): APTT in the last 72 hours. UA:No results for input(s): NITRITE, COLORU, PHUR, LABCAST, WBCUA, RBCUA, MUCUS, TRICHOMONAS, YEAST, BACTERIA, CLARITYU, SPECGRAV, LEUKOCYTESUR, UROBILINOGEN, BILIRUBINUR, BLOODU, GLUCOSEU, AMORPHOUS in the last 72 hours. Invalid input(s): Cathy Douglas  No results for input(s): PH, PCO2, PO2 in the last 72 hours.       Films:  Radiology Review:  Pertinent images / reports were reviewed as a part of this visit. Echo Complete  Transthoracic Echocardiography Report (TTE)     Demographics      Patient Name       [facility] DIANE COVARRUBIAS      Date of Study      08/14/2021        Gender              Female      Patient Number     [id number]        Date of Birth       11/07/1932      Visit Number       [id number]         Age                 80 year(s)      Accession Number   [id number]        Room Number         8944      Corporate ID       [id number]           Sonographer         Pete Vazquez RDCS      Ordering Physician Divine King MD  Interpreting        [address] [place] [address].                                        Physician           Gian Cronin MD     Procedure    Type of Study      TTE procedure:ECHOCARDIOGRAM COMPLETE 2D W DOPPLER W COLOR. Procedure Date  Date: 08/14/2021 Start: 01:46 PM    Study Location: [facility]  Technical Quality: Adequate visualization    Indications:Pulmonary edema. Additional Indications:HX of AFIB  Hx of TIA . Patient Status: Routine    Height: 63 inches Weight: 168 pounds BSA: 1.8 m2 BMI: 29.76 kg/m2    Rhythm: Within normal limits HR: 72 bpm BP: 127/65 mmHg     Conclusions      Summary   Left ventricular cavity size is normal.Normal left ventricular wall   thickness. Ejection fraction is visually estimated to be 60-65%. No regional wall motion abnormalities are noted. Grade II diastolic   dysfunction with elevated LV filling pressures. Mildly dilated right ventricle. Right ventricular systolic function is normal.   Mild aortic stenosis with a peak velocity of 2.1m/s. Estimated pulmonary artery systolic pressure is normal at 33mmHg assuming a   right atrial pressure of 3 mmHg.       Signature      ------------------------------------------------------------------   Electronically signed by Gian Cronin MD (Interpreting physician) on 08/15/2021 at 08:36 AM   ------------------------------------------------------------------      Findings      Left Ventricle   Left ventricular cavity size is normal.   Normal left ventricular wall thickness. Ejection fraction is visually estimated to be 60-65%. No regional wall motion abnormalities are noted. Grade II diastolic dysfunction with elevated LV filling pressures. Mitral Valve   Mitral annular calcification is present. Calcification of the posterior leaflet of the mitral valve. Trivial mitral regurgitation. No evidence of mitral stenosis. Left Atrium   Left atrium is of normal size. Aortic Valve   Mild aortic stenosis with a peak velocity of 2.1m/s and a mean pressure   gradient of 10mmHg. No evidence of aortic valve regurgitation. Aorta   The aortic root is normal in size. The ascending aorta is normal in size. Right Ventricle   Mildly dilated right ventricle. Right ventricular systolic function is normal.   TAPSE= 3.0cm      Tricuspid Valve   Mild tricuspid regurgitation. No evidence of tricuspid stenosis. Right Atrium   The right atrium is mildly dilated. Pulmonic Valve   Trivial pulmonic regurgitation present. No evidence of pulmonic valve stenosis. Pericardial Effusion   No pericardial effusion noted. Pleural Effusion   No pleural effusion. Miscellaneous   IVC size is normal (<2.1cm) and collapses > 50% with respiration consistent   with normal RA pressure (3mmHg). Estimated pulmonary artery systolic pressure is normal at 33mmHg assuming a   right atrial pressure of 3 mmHg.      M-Mode/2D Measurements (cm)      LV Diastolic Dimension: 2.68 cm LV Systolic Dimension: 5.68 cm   LV Septum Diastolic: 0.22 cm   LV PW Diastolic: 6.27 cm        AO Root Dimension: 8.89 cm   RV Diastolic Dimension: 5.78 cm                                   LA Area: 19.3 cm2   LVOT: 1.81 cm                   LA volume/Index: 56.6 ml /31 ml/m2     Doppler Measurements      AV Peak Velocity: 211 cm/s     MV Peak E-Wave: 103 cm/s   AV Peak Gradient: 17.81 mmHg   MV Peak A-Wave: 117 cm/s   AV Mean Gradient: 9 mmHg       MV E/A Ratio: 0.88   LVOT Peak Velocity: 169 cm/s   MV P1/2t: 80 msec   AV Area (Continuity):2.13 cm2  MV Mean Gradient: 3 mmHg                                  MV Max P mmHg   TR Velocity:304 cm/s           MV Vmax:123 cm/s   TR Gradient:36.97 mmHg         MV VTI:46 cm/s   Estimated RAP:3 mmHg   E' Septal Velocity: 7.62 cm/s  MV Area (continuity): 1.83 cm2   E' Lateral Velocity: 7.83 cm/s MV Deceleration Time: 272 msec   PV Peak Velocity: 94.7 cm/s    MV Area (PHT): 2.75 cm2   PV Peak Gradient: 3.59 mmHg      Aortic Valve      Peak Velocity: 211 cm/s     Mean Velocity: 142 cm/s   Peak Gradient: 17.81 mmHg   Mean Gradient: 9 mmHg   Area (continuity): 2.13 cm2   AV VTI: 39.6 cm     Aorta      Aortic Root: 2.96 cm   Ascending Aorta: 3.19 cm   LVOT Diameter: 1.81 cm             Access  Arterial       PICC           CVC                   Thank you for this consult,    Odalys Payne MD  [facility] Pulmonary, Critical Care, and Sleep Medicine

## 2021-08-15 NOTE — PLAN OF CARE
Problem: Falls - Risk of:  Goal: Will remain free from falls  Description: Will remain free from falls  8/15/2021 1114 by Frankie Kline RN  Outcome: Ongoing  Note: Side rails up x 3. Bed locked and low position. Falling star program remains in place. Call light and personal belongings within reach. Frequent visual monitoring continues. Toileting program inplace. Patient assisted in turning/repositioning at least once every 2 hours, and on a prn basis. Problem: Falls - Risk of:  Goal: Absence of physical injury  Description: Absence of physical injury  8/15/2021 1114 by Frankie Kline RN  Outcome: Ongoing  Note: No physical injury during this shift. Problem: Breathing Pattern - Ineffective:  Goal: Ability to achieve and maintain a regular respiratory rate will improve  Description: Ability to achieve and maintain a regular respiratory rate will improve  8/15/2021 1114 by Frankie Kline RN  Outcome: Ongoing  Note: Patient on BIPAP during this shift. Problem: Airway Clearance - Ineffective  Goal: Achieve or maintain patent airway  8/15/2021 1114 by Frankie Kline RN  Outcome: Ongoing  Note: Patient on BIPAP or Airvo during this shift. Problem: Gas Exchange - Impaired  Goal: Absence of hypoxia  8/15/2021 1114 by Frankie Kline RN  Outcome: Ongoing  Note: Patient keeping O2 sat greater than 92%     Problem: Gas Exchange - Impaired  Goal: Promote optimal lung function  8/15/2021 1114 by Frankie Kline RN  Outcome: Ongoing     Problem: Breathing Pattern - Ineffective  Goal: Ability to achieve and maintain a regular respiratory rate  8/15/2021 1114 by Frankie Kline RN  Outcome: Ongoing  Note: Patient with increased respiratory rate. Problem: Body Temperature -  Risk of, Imbalanced  Goal: Ability to maintain a body temperature within defined limits  8/15/2021 1114 by Frankie Kline RN  Outcome: Ongoing     Problem:  Body Temperature -  Risk of, Imbalanced  Goal: Will regain or maintain usual level of consciousness  8/15/2021 1114 by Domo Larson RN  Outcome: Ongoing     Problem:  Body Temperature -  Risk of, Imbalanced  Goal: Complications related to the disease process, condition or treatment will be avoided or minimized  8/15/2021 1114 by Domo Larson RN  Outcome: Ongoing     Problem: Isolation Precautions - Risk of Spread of Infection  Goal: Prevent transmission of infection  8/15/2021 1114 by Domo Larson RN  Outcome: Ongoing     Problem: Risk for Fluid Volume Deficit  Goal: Maintain normal heart rhythm  8/15/2021 1114 by Domo Larson RN  Outcome: Ongoing     Problem: Risk for Fluid Volume Deficit  Goal: Maintain absence of muscle cramping  8/15/2021 1114 by Domo Larson RN  Outcome: Ongoing     Problem: Risk for Fluid Volume Deficit  Goal: Maintain normal serum potassium, sodium, calcium, phosphorus, and pH  8/15/2021 1114 by Domo Larson RN  Outcome: Ongoing     Problem: Loneliness or Risk for Loneliness  Goal: Demonstrate positive use of time alone when socialization is not possible  8/15/2021 1114 by Domo Larson RN  Outcome: Ongoing     Problem: Fatigue  Goal: Verbalize increase energy and improved vitality  8/15/2021 1114 by Domo Larson RN  Outcome: Ongoing     Problem: Patient Education: Go to Patient Education Activity  Goal: Patient/Family Education  8/15/2021 1114 by Domo Larson RN  Outcome: Ongoing

## 2021-08-15 NOTE — PROGRESS NOTES
1915: Report received from [address]; handoff complete. Pt is awake and alert, sitting up in bed and eating dinner. Respirations mildly tachypneic and unlabored. Denies active distress. SpO2 88-90% on Airvo. Call light in reach. 1945: Assessment complete--see flow sheets. 2100: Bath complete, tolerated activity well  2115: Tylenol given for c/o B mid scapular pain; repositioned for comfort  2145: Pt reports no improvement in pain, message sent to Dr. Elena Fitzgerald for alternative treatment options   2210: Caralee Sine given for pain; repositioned for comfort. Will watch  2330: SpO2 80-84%. Placed on Bi-pap. Reports no change in pain symptoms. Reports visual hallucinations. In SR per monitor. Respirations remain tachypneic. 2335: Message sent to Dr. Elena Fitzgerald re: above symptoms. 2342: New orders received  2350: EKG results reported to MD. Radiology at bedside  0005: Dr Elena Fitzgerald present on unit, call out to cardiologist Dr. Sushma Inman. 0010: Pt c/o nausea. Bi pap removed and placed back on Airvo. Nitro gtt started. Pt updated on condition and plan. 0020: Dr. Elena Fitzgerald placed call to pt's son Sanjana Valencia. Titrating Nitro for pain relief; remains 10/10  0025: Call received from pt's son Sanjana Valencia, does not want to change pt's code status to Full as pt changed her status to CCA. Dr. Elena Fitzgerald informed this RN that Dr. Sushma Inman required change to full code to take to cath lab. Dr. Elena Fitzgerald spoke with pt and informed her of situation, states it is her wish to go to cath lab; son in agreement as it is pt's wish. Code status changed and call placed to Dr. Chriss Khalil: Spoke with Dr. Sushma Inman,  Code STEMI called. Attempted to place second PIV, unable to place by 3 RN's.  0100: ASA given at this time. SpO2 82% on airvo. RT updated and increased to 60liters flow. NRB added. Report given to cath lab team  [phone]: To cath lab  0229: Returned from Cath lab, awake and alert. On bi-pap. Dry cough noted. In SR per monitor.  Radial band in place with 13cc air per cath lab

## 2021-08-15 NOTE — PROGRESS NOTES
Order for patient to transfer to ICU due to respiratory distress and increasing oxygen demand. Patients belongings gathered. Patient transferred to ICU room 2122 on bipap with RN x 2 and RT. Report given to Jeramy CORNELL. Patient's daughter, Fernanda Salcedo [phone] called and informed of her mothers transfer to ICU.      Electronically signed by Jagdeep De La Garza RN on 8/15/2021 at 9:24 AM

## 2021-08-15 NOTE — PROGRESS NOTES
Hospitalist Progress Note      PCP: TOÑO Mcqueen - CNP    Date of Admission: 8/11/2021    Chief Complaint: hypoxia    Hospital Course:    Subjective: appeared to worsen on Bipap but discovered that High flow had a leak and after fixing tubing, placed back on High flow      Medications:  Reviewed    Infusion Medications    sodium chloride 100 mL/hr at 08/14/21 0456     Scheduled Medications    cefepime  2,000 mg Intravenous Q12H    furosemide  40 mg Intravenous BID    dexamethasone  10 mg Intravenous Q24H    lactobacillus  1 capsule Oral Daily with breakfast    pantoprazole  40 mg Oral QAM AC    vancomycin (VANCOCIN) intermittent dosing (placeholder)   Other RX Placeholder    vancomycin  1,000 mg Intravenous Q24H    amiodarone  100 mg Oral Daily    apixaban  5 mg Oral BID    ciprofloxacin  1 drop Both Eyes 2 times per day    dilTIAZem  120 mg Oral Daily    [Held by provider] lisinopril  40 mg Oral Daily    sodium chloride flush  5-40 mL Intravenous 2 times per day    azithromycin  500 mg Intravenous Q24H     PRN Meds: sodium chloride flush, sodium chloride, acetaminophen **OR** acetaminophen, benzonatate, ipratropium-albuterol      Intake/Output Summary (Last 24 hours) at 8/15/2021 0932  Last data filed at 8/15/2021 0552  Gross per 24 hour   Intake 2255.54 ml   Output 450 ml   Net 1805.54 ml       Exam:    /60   Pulse 66   Temp 97.5 °F (36.4 °C) (Axillary)   Resp 28   Ht 5' 3\" (1.6 m)   Wt 170 lb 3.1 oz (77.2 kg)   SpO2 91%   BMI 30.15 kg/m²     General appearance: No apparent distress, appears stated age and cooperative. HEENT: Pupils equal, round, and reactive to light. Conjunctivae/corneas clear. Neck: Supple, with full range of motion. No jugular venous distention. Trachea midline. Respiratory:  Normal respiratory effort. Clear to auscultation, bilaterally without Rales/Wheezes/Rhonchi.   Cardiovascular: Regular rate and rhythm with normal S1/S2 without murmurs, rubs or gallops. Abdomen: Soft, non-tender, non-distended with normal bowel sounds. Musculoskeletal: No clubbing, cyanosis or edema bilaterally. Full range of motion without deformity. Skin: Skin color, texture, turgor normal.  No rashes or lesions. Neurologic:  Neurovascularly intact without any focal sensory/motor deficits. Cranial nerves: II-XII intact, grossly non-focal.  Psychiatric: Alert and oriented, thought content appropriate, normal insight  Capillary Refill: Brisk,< 3 seconds   Peripheral Pulses: +2 palpable, equal bilaterally       Labs:   Recent Labs     08/13/21  1511 08/14/21  0623 08/15/21  0526   WBC 18.1* 14.6* 18.8*   HGB 11.9* 11.7* 11.8*   HCT 34.8* 34.6* 35.4*    303 288     Recent Labs     08/13/21  1511 08/14/21  0623 08/15/21  0526    137 135*   K 3.3* 3.2* 3.5   CL 96* 97* 97*   CO2 24 29 28   BUN 23* 28* 43*   CREATININE 1.6* 1.2 1.3*   CALCIUM 9.0 8.7 8.7     Recent Labs     08/13/21  1511   AST 20   ALT 14   BILITOT 0.5   ALKPHOS 106     Recent Labs     08/13/21  0643 08/14/21  0623 08/15/21  0526   INR 2.15* 2.18* 2.12*     Recent Labs     08/13/21  1510   TROPONINI 0.01       Urinalysis:    No results found for: Deward Goon, BACTERIA, RBCUA, BLOODU, SPECGRAV, GLUCOSEU    Radiology:  CT CHEST WO CONTRAST   Final Result   Large amount of diffuse bilateral pulmonary disease. Some of this appears to   be due to pulmonary edema, with associated left greater than right pleural   effusions. Superimposed multifocal pneumonia is likely present and may   either be viral/COVID pneumonia or bacterial pneumonia. This has   significantly increased over the past 2 days. XR CHEST (2 VW)   Final Result   Mild cardiomegaly and mild pulmonary edema which is more prominent. Hazy parenchymal opacities scattered in both lungs which could be due to   pulmonary edema and/or associated developing pneumonia. Small bibasilar pleural effusions.       RECOMMENDATION:   Mild Assessment/Plan:    Active Hospital Problems    Diagnosis Date Noted    Acute respiratory failure with hypoxia ([facility] [address] [J96.01] 08/12/2021    Community acquired bacterial pneumonia [J15.9] 08/12/2021    Sepsis ([facility] [A41.9] 08/11/2021     Acute Hypoxia:  Worsened  - pulm consulted:  Likely pulmonary edema and pneumonia  - antibiotics broaded  - lasix increased  - decadron started  - Echo ordered  - wean to RA as toleratered    Sepsis:  - treat pneumonia  - f/u blood cultures    Pneumonia:  - CAP antibiotics    PIETER:  - holding lisinopril    H/o paroxysmal A fib:  Cont Eliquis, amiodarone  HTN:  Cont home medications    DVT Prophylaxis: Eliquis  Diet: ADULT DIET; Regular  Code Status: Full code after discussion with patient and son.   Does not want extended ventilator use, does not want tracheostomy    PT/OT Eval Status: SNF    Dispo - Myrna Andre MD

## 2021-08-15 NOTE — PROGRESS NOTES
Respiratory Therapy    Pt calling out. Upon arrival to the room, patient had removed Airvo and SPO2 54% on RA and in respiratory distress.    Replaced BIPAP 10/5 14 FIO2 100%  Vt inadequate on current settings  (100-200 mL)  Message sent to MD regarding setting increase   Settings increased to 12/6 and Vt improved  Current settings 12/6 14 FIO2 100% SPO2 91%  RN informed    Electronically signed by Ladarius Arcos RCP on 8/15/2021 at 8:08 AM

## 2021-08-15 NOTE — PROGRESS NOTES
Late entry:  0905:Pt coming in bed from PCU. Pt alert,oriented x 4, follow commands, on BIPAP keeping O2 sat greater than 92%. idiopathic thrombocytopenic purpura si noted a PIV in her right arm and purewick connected top suction. Lungs:breath sounds decreased in both hemithhorax. Call bell in reach. Side rails upx  3.B ed locked and low position. Cardiac monitor:NSR. Continue monitoring. 1054:Vishnu,RT placing Airvo at 60% and taking out BIPAP. 1152; FiO2 sat in Airvo was decreased to 50%. 1245:Patient's son in room talking with Dr. Roselia Bradley and mother. Patient and family informing Dr. Roselia Bradley that pt doesn't want to have chest compressions and shock in heart stop. MD planning to change code status of pt.

## 2021-08-16 NOTE — PROCEDURES
The [facility] CATH and intervention    Deb Ni   80 y.o., female  11/7/1932 8/16/2021    Procedure performed by Dr. Clair Mobley MD, [facility] [place]  Surgical assistants :none    Procedure  Selective Coronary Angiography  Cardiac Catheterization for Coronary Anatomy  Left Heart Catheterization  Left Ventriculogram  PTCA stent to the LAD 2.5 x 33 and 2.5 x 28 Lise  Radial artery access assisted by ultrasound  Arterial Access Right Radial Artery after a negative All test  TR Band    If there is perceived to be a delay in door to balloon or EKG to intervention time it is because this patient was a DNR CCA category and with the onset of her chest pains and the EKG changes there was a delay in  family decision making. Once decision was made we were very aggressive and very deft in accessing the patient's coronary arteries and in treating these arteries. Indication:Cardiac cath to rule out ischemic CAD, Possible angioplasty, The procedure and risks described to patient including risk of CVA, MI, bleeding, emergency surgery, death, this patient is in hospital for several days with acute respiratory failure. On very high flow oxygen to maintain saturations. Developed chest pains this evening and EKG did show evidence for an anterior wall ST segment elevation MI. Did apparently have an echocardiograph done 2 days ago interpreted as having ejection fraction of 65%.   Or Consent signed  Unspecified Angina  Anesthesia: Moderate sedation with Versed and Fentanyl IV  Anesthesia Start time 1 AM  Anesthesia end time 2 AM  Estimated blood loss :minimal       Procedure Description:  After written informed consent was obtained, the patient was   brought to the cardiac catheterization suite, where patient was prepped and   draped in the usual sterile fashion. Local anesthesia was achieved in the   right wrist with 2% lidocaine. A 5-Irish hemostasis sheath was placed into   the right radial artery. The pre cocktail of heparin, verapamil and nitroglycerin was injected into the sheath. A 5-Irish XB 3.5 guide catheter was introduced; used to engage the left main   coronary artery. Radiographic  images were obtained. The catheter was pulled back then rotated. The JR4 catheter was introduced  to engage the right coronary   artery. Radiographic  images were obtained. The catheter was removed and exchanged over an exchange length 0.35 soft guide wire. A 5-Irish angled pigtail catheter was then positioned in the left ventricle. Left ventriculogram was performed. After these images were obtained. , the   catheter was flushed, pulled back across the aortic valve revealing no gradient. The catheter was removed. The hemostasis sheath was removed and hemostasis was achieved using a TR Band     There were no complications. Patient tolerated the procedure well. The   patient was transferred to the holding area in stable condition. Contrast consumed 130 cc  Flouro time 8.6 minutes    Results:  Left ventricular pressure 22 mmHg  Aortic pressure 108 mmHg    Coronary anatomy:   The left main coronary artery is normal.  There is some calcification. Left anterior descending artery is completely occluded in the mid distal portion. We were able to open it with angioplasty stenting. Circumflex artery is normal.    The right coronary artery is a dominant vessel and has moderate plaquing. Left ventriculogram shows ejection fraction of 35%. Anterior wall apical areas are akinetic and somewhat dyskinetic and may be aneurysmal.      PCI we did use an XB 3.5 guide catheter.   Affect assessed in the left main we placed a BMW guidewire down the LAD which was completely occluded. We were able to get through the occlusion and then we then predilated later that area with a 2.5 mm balloon. After predilatation was performed we were then able to place a 2.5 x 33 mm [place] drug-eluting stent into the mid distal aspect of the LAD. The stent was deployed and that balloon was removed. We then placed a more proximal stent with very slight overlap of 2.5 x 28 mm [place] drug-eluting into a more at proximal aspect of the LAD. After stent deployment the vessel was open very nicely with great flow. from a DONN 0 flow to a DONN-3 flow. .    We did use Integrilin bolus and drip. This patient did have a dose of her Eliquis at about 2 hours before the acute MI so we did not give additional heparin during this procedure. Heparin was given with our radial artery cocktail however. We did start her on Brilinta at 180 mg. Impression:  Severe stenosis complete occlusion of the LAD which was treated with balloon angioplasty and stenting. Evidence for anterior apical myocardial infarction with absent contractility. Whether this is stunned or completed infarct is unclear. Complications: none      Plan:  Start her on Brilinta. We will keep her on Integrilin for 12-hour drip. We did not give further aspirin at this time. She is on Eliquis for presumed atrial fibrillation and also on amiodarone. She is still on high flow oxygen because of her severe advanced lung disease.     This note was likely completed using voice recognition technology and may contain unintended errors  Melinda Trotter MD M.D., [facility] [place]    Cc; TOÑO Figueroa - CNP

## 2021-08-16 NOTE — PROGRESS NOTES
Pts son states that he is on his way. He does not want compressions or any aggressive measures at this time. He states that if the patient expires before his arrival, he understands.

## 2021-08-16 NOTE — DISCHARGE SUMMARY
Hospital Medicine Discharge Summary    Patient ID: Chase Mace      Patient's PCP: TOÑO Ortiz CNP    Admitting Physician: Kristie Taylor MD  Discharge Physician: Patsy Deng MD     Admit Date: 2021     Disposition:     Discharge Diagnoses: Active Hospital Problems    Diagnosis     Tension pneumothorax [J93.0]     Acute respiratory failure with hypoxia ([address] [J96.01]     Community acquired bacterial pneumonia [J15.9]     Sepsis ([address] [A41.9]        Date of Death:  2021  Time of Death:  8:56 A. M. Immediate Cause of Death:  STEMI  Underlying Conditions:  ARDS, hemoptysis, pneumothorax  Other Contributing Conditions:  Community acquired pneumonia    Hospital Course:     Ms. Levi Galvan was admitted with hypoxia due to community acquire pneumonia and potential pulmonary edema. After two days of being hospitalized, she acutely became more hypoxia up to 15L and non-rebreather. She was attempted to be diuresed but subsequently did require high flow. On consultation with pulmonology, it was thought she had a mixed picture of ARDS and pulmonary edema. While respiratory wise she was stable on high flow, she did develop a STEMI, hemoptysis, and required intubation. She had emergent cardiac cath that showed total occlusion of LAD and had stent placed. Subsequently, the family had a goals of care discussion and it was determined to make her comfort care. Consults:  IP CONSULT TO PULMONOLOGY  IP CONSULT TO CARDIAC REHAB    Signed: Patsy Deng MD MD   2021    Thank you TOÑO Ortiz CNP for the opportunity to be involved in this patient's care. If you have any questions or concerns please feel free to contact me at [phone].

## 2021-08-16 NOTE — PROCEDURES
Bronchoscopy Procedure Note    Date of Operation: 8/16/2021    Surgeon: Mikayla Wood MD    Anesthesia: Versed 2 mg fentanyl and propofol infusions    Operation: Flexible fiberoptic bronchoscopy, diagnostic / therapeutic    Estimated Blood Loss: less than 50     Complications: None    Indications and History:  The patient is a 80 y.o. female with severe ARDS, hemoptysis and profound hypoxemia. Emergent bronchoscopy performed. Description of Procedure:  Patient was placed supine and identified as Farzad Fuentes. Portable bronchoscopy passed through the ET tube into the main lourdes. Subsequent inspection of right mainstem, right upper lobe, right middle lobe and right lower lobe segmental airways as well as the left mainstem, left upper lobe, lingula and left lower lobe airways were all visualized. Endobronchial findings:    Trachea: Normal mucosa  Lourdes: Normal mucosa  Right main bronchus: Normal mucosa  Right upper lobe bronchus including anterior, apical and posterior until the fourth generation bronchi: Normal mucosa  Right intermedius bronchus: Normal mucosa  Right middle lobe: Normal mucosa  Right lower lobe superior segment uptill 3rd generation bronchi: Normal mucosa  Left main bronchus: Normal mucosa  Left upper lobe including Lingula, anterior segment, and apico-posterior segment through the 4th generation bronchi: Normal mucosa  Left lower lobe basilar and superior segments uptill 4th generation bronchi: Normal mucosa    All airways were open and patent, there was no active source of bleeding but there is diffuse serosanguineous fluid equally in all segmental airways. ET tube and proper position fiberoptically.     Patient remains in ICU critically ill        Mikayla Wood MD

## 2021-08-16 NOTE — PROGRESS NOTES
Physical Therapy  Pt transfer to ICU following Cardiac Cath, now Intubated. Please reconsult once extubated and approp for PT.   Thank you, Angi Lambert, PT [phone]

## 2021-08-16 NOTE — PROGRESS NOTES
Hospitalist Progress Note      PCP: Thomas Galindo, TOÑO - CNP    Date of Admission: 8/11/2021    Chief Complaint: acute hypoxemic resp failure    Hospital Course: 88f admitted with cough, sob x 4 days, required increased O2, was being treated for asp pna. She developed pain between her shoulder blades with worsening hypoxemia, cx revealed edema, ekg  with STEMI. Patient was alert, oriented and expressed desire to remove her DNR cca status and resume full code. She underwent angiogram with PCI to her LAD, was transferred back to the ICU chest pain free, however with development of hemoptysis and progressive hypoxemia requiring intubation.     Subjective: Patient denied chest pain, post PCI, agreed with intubation / ventilation given reps distress      Medications:  Reviewed    Infusion Medications    nitroGLYCERIN Stopped (08/16/21 0205)    eptifibatide Stopped (08/16/21 0356)    sodium chloride 5 mL/hr at 08/16/21 0451    propofol 20 mcg/kg/min (08/16/21 0450)    norepinephrine 8 mcg/min (08/16/21 0447)    fentaNYL 200 mcg/hr (08/16/21 0543)    midazolam 5 mg/hr (08/16/21 0552)    sodium chloride Stopped (08/16/21 0236)     Scheduled Medications    aspirin  325 mg Oral Daily    fentaNYL        fentanNYL  25 mcg Intravenous Once    midazolam        cefepime  2,000 mg Intravenous Q12H    furosemide  40 mg Intravenous BID    dexamethasone  10 mg Intravenous Q24H    lactobacillus  1 capsule Oral Daily with breakfast    pantoprazole  40 mg Oral QAM AC    amiodarone  100 mg Oral Daily    apixaban  5 mg Oral BID    ciprofloxacin  1 drop Both Eyes 2 times per day    dilTIAZem  120 mg Oral Daily    [Held by provider] lisinopril  40 mg Oral Daily    sodium chloride flush  5-40 mL Intravenous 2 times per day     PRN Meds: fentanNYL, sodium chloride flush, sodium chloride, acetaminophen **OR** acetaminophen, benzonatate, ipratropium-albuterol      Intake/Output Summary (Last 24 hours) at 8/16/2021 either be viral/COVID pneumonia or bacterial pneumonia. This has   significantly increased over the past 2 days. XR CHEST (2 VW)   Final Result   Mild cardiomegaly and mild pulmonary edema which is more prominent. Hazy parenchymal opacities scattered in both lungs which could be due to   pulmonary edema and/or associated developing pneumonia. Small bibasilar pleural effusions.       RECOMMENDATION:   Mild         XR CHEST PORTABLE    (Results Pending)   XR CHEST PORTABLE    (Results Pending)           Assessment/Plan:    Active Hospital Problems    Diagnosis Date Noted    Acute respiratory failure with hypoxia ([address] [J96.01] 08/12/2021    Community acquired bacterial pneumonia [J15.9] 08/12/2021    Sepsis ([facility] [facility] [A41.9] 08/11/2021     Hypxemic resp failure  - continue abx coverage  - s/p intubation  - Pulmonology called for bronch    STEMI  - s/p PCI this am, chest pain free afterwards  - holding anticoag            tot crit care time 35 min    Joel Barton MD

## 2021-08-16 NOTE — PROGRESS NOTES
Pulmonary Progress Note    Date of Admission: 8/11/2021   LOS: 5 days     CC:  Chief Complaint   Patient presents with    Cough     pt sent to ED wtih cough, shortness of breath for last 4 days. sent by PCP. Assessment/Plan     Acute hypoxic respiratory failure with SPO2 less than 90% room air  -Wean supplemental oxygen goal SPO2 90%  -Multiple contributing etiologies: She has underlying diffuse parenchymal lung disease that we have been working up, she also has new onset presumed plan DAH from anticoagulation following PCI, she also has new tension pneumothorax. Tension pneumothorax-new  -Called to the bedside for inability to oxygenate and profound hypotension despite 2 pressors  -Oxygenating well with BVM, stat chest x-ray with right-sided pneumothorax and mediastinal shift  -Urgent placement of right-sided chest tube with immediate improvement in blood pressure, O2 saturations also improved  -Repeat stat chest x-ray    Severe ARDS,-new  Diffuse alveolar hemorrhage-new  -P: F less than 100  -Due to [facility] and previous underlying parenchymal lung disease  -Attempting low tidal volume ventilation but unable due to profound hypoxemia despite heavy sedation, high PEEP, paralyzation.  -Continue cefepime  -Check autoimmune, connective tissue disease/vasculitic markers,  -Fungal work-up  -PJP sputum    Obstructive shock  -Due to tension pneumothorax  -Improved after chest tube placement  -Titrate pressors goal map 65    STEMI  -Status post PCI to totally occluded LAD  -Had to hold anticoagulation due to diffuse alveolar hemorrhage and profound hypoxemia    On Eliquis    Due to the immediate potential for life-threatening deterioration due to shock, tension pneumothorax, I spent 140 minutes providing critical care at the bedside. This time is excluding time spent performing separately billable procedures. HPI/Subjective  STEMI overnight with acute respiratory decompensation.   Profound hypoxemia this morning and development of tension pneumothorax. ROS:   Able to obtain due to mechanical ventilation      Intake/Output Summary (Last 24 hours) at 8/16/2021 0721  Last data filed at 8/16/2021 0600  Gross per 24 hour   Intake 448.74 ml   Output 200 ml   Net 248.74 ml         PHYSICAL EXAM:   Blood pressure (!) 82/46, pulse 79, temperature 98.8 °F (37.1 °C), temperature source Axillary, resp. rate 24, height 5' 3\" (1.6 m), weight 167 lb 15.9 oz (76.2 kg), SpO2 (!) 74 %.'  Gen:  No acute distress. Eyes: PERRL. Anicteric sclera. No conjunctival injection. ENT: No discharge. OP with ETT. External appearance of ears and nose normal.  Neck: Trachea midline. No mass   Resp: + Bilateral crackles. No wheezes. No rhonchi. No dullness on percussion. CV: Regular rate. Regular rhythm. No murmur or rub. No edema. GI: Soft, Non-tender. Non-distended. +BS  Skin: Warm, dry, w/o erythema. Lymph: No cervical or supraclavicular LAD. M/S: No cyanosis. No clubbing.     Neuro: Intubated and sedated      Medications:    Scheduled Meds:   aspirin  325 mg Oral Daily    fentaNYL        fentanNYL  25 mcg Intravenous Once    midazolam        cefepime  2,000 mg Intravenous Q12H    furosemide  40 mg Intravenous BID    dexamethasone  10 mg Intravenous Q24H    lactobacillus  1 capsule Oral Daily with breakfast    pantoprazole  40 mg Oral QAM AC    amiodarone  100 mg Oral Daily    apixaban  5 mg Oral BID    ciprofloxacin  1 drop Both Eyes 2 times per day    dilTIAZem  120 mg Oral Daily    [Held by provider] lisinopril  40 mg Oral Daily    sodium chloride flush  5-40 mL Intravenous 2 times per day       Continuous Infusions:   nitroGLYCERIN Stopped (08/16/21 0205)    eptifibatide Stopped (08/16/21 0356)    sodium chloride 5 mL/hr at 08/16/21 0600    propofol Stopped (08/16/21 0553)    norepinephrine 35 mcg/min (08/16/21 0635)    fentaNYL 200 mcg/hr (08/16/21 0543)    midazolam 5 mg/hr (08/16/21 0600)    sodium chloride      vasopressin (Septic Shock) infusion 0.04 Units/min (08/16/21 0651)    sodium chloride Stopped (08/16/21 0236)       PRN Meds:  fentanNYL, sodium chloride, sodium chloride flush, sodium chloride, acetaminophen **OR** acetaminophen, benzonatate, ipratropium-albuterol    Labs reviewed:  CBC:   Recent Labs     08/14/21  0623 08/15/21  0526 08/16/21  0505   WBC 14.6* 18.8* 23.1*   HGB 11.7* 11.8* 9.9*   HCT 34.6* 35.4* 29.5*   MCV 91.8 91.8 92.2    288 233     BMP:   Recent Labs     08/13/21  1511 08/14/21  0623 08/15/21  0526    137 135*   K 3.3* 3.2* 3.5   CL 96* 97* 97*   CO2 24 29 28   BUN 23* 28* 43*   CREATININE 1.6* 1.2 1.3*     LIVER PROFILE:   Recent Labs     08/13/21  1511   AST 20   ALT 14   BILITOT 0.5   ALKPHOS 106     PT/INR:   Recent Labs     08/14/21  0623 08/15/21  0526 08/16/21  0505   PROTIME 25.5* 24.7* 26.7*   INR 2.18* 2.12* 2.28*     APTT:   Recent Labs     08/16/21  0505   APTT 28.0     UA:No results for input(s): NITRITE, COLORU, PHUR, LABCAST, WBCUA, RBCUA, MUCUS, TRICHOMONAS, YEAST, BACTERIA, CLARITYU, SPECGRAV, LEUKOCYTESUR, UROBILINOGEN, BILIRUBINUR, BLOODU, GLUCOSEU, AMORPHOUS in the last 72 hours. Invalid input(s): Derrill Nissen  No results for input(s): PH, PCO2, PO2 in the last 72 hours. Films:  Radiology Review:  Pertinent images / reports were reviewed as a part of this visit. XR CHEST PORTABLE  Narrative: EXAMINATION:  ONE XRAY VIEW OF THE CHEST    8/16/2021 5:34 am    COMPARISON:  08/15/2021    HISTORY:  ORDERING SYSTEM PROVIDED HISTORY: ETT placement  TECHNOLOGIST PROVIDED HISTORY:  Reason for exam:->ETT placement  Reason for Exam: ett placement    FINDINGS:  A right-sided pneumothorax is now present with depression of the right  hemidiaphragm and leftward shift of the mediastinum. There has been interval  worsening of diffuse bilateral airspace opacities. The heart size is stable.   Endotracheal tube has been placed with the tip 4.5 cm above the lourdes. Impression: 1. Right-sided pneumothorax, probably a tension pneumothorax. 2. Worsening bilateral airspace opacities. Critical results were called by Dr. Jacobo Cedeno MD to Dr Ross Adkins on  8/16/2021 at 06:38. XR CHEST PORTABLE  Narrative: EXAMINATION:  ONE XRAY VIEW OF THE CHEST    8/15/2021 11:46 pm    COMPARISON:  08/11/2021    HISTORY:  ORDERING SYSTEM PROVIDED HISTORY: pna  TECHNOLOGIST PROVIDED HISTORY:  Reason for exam:->pna  Reason for Exam: pna  Acuity: Acute  Type of Exam: Initial    FINDINGS:  There has been significant progression of diffuse bilateral airspace  opacities. The heart size is mildly enlarged. Left pleural effusion is  noted. There is no discernible pneumothorax. Impression: Worsening bilateral airspace opacities which could represent pneumonia or  edema.           Access  Arterial       PICC           CVC                   Thank you for this consult,    Jamal Ferrari MD  [facility] Pulmonary, Critical Care, and Sleep Medicine

## 2021-08-16 NOTE — PROCEDURES
Demetra Hanna is a 80 y.o. female patient. 1. Shock ([address]    2. Community acquired bilateral lower lobe pneumonia    3. Acute respiratory failure with hypoxia (HCC)    4. PIETER (acute kidney injury) ([facility]    5. Community acquired bacterial pneumonia      Past Medical History:   Diagnosis Date    Arthritis     Atrial fibrillation, transient ([facility]     Transient ischemic attack (TIA)      Blood pressure (!) 82/46, pulse 79, temperature 98.8 °F (37.1 °C), temperature source Axillary, resp. rate 24, height 5' 3\" (1.6 m), weight 167 lb 15.9 oz (76.2 kg), SpO2 (!) 74 %. Chest Tube Insertion    Date/Time: 8/16/2021 7:27 AM  Performed by: Rosalind Partida MD  Authorized by: Rosalind Partida MD   Consent: The procedure was performed in an emergent situation.   Required items: required blood products, implants, devices, and special equipment available  Indications: tension pneumothorax    Sedation:  Patient sedated: yes  Sedatives: midazolam  Analgesia: fentanyl    Preparation: skin prepped with ChloraPrep  Placement location: right lateral  Tube size (Lithuanian): 14.  Ultrasound guidance: yes (Absence of lung sliding noted in the triangle of safety.)  Tension pneumothorax heard: yes  Tube connected to: suction  Drainage characteristics: serosanguinous  Drainage amount: 20 ml  Suture material: 2-0 silk  Dressing: 4x4 sterile gauze  Patient tolerance: patient tolerated the procedure well with no immediate complications  Comments: EBL <50cc          Rsoalind Partida MD  8/16/2021

## 2021-08-16 NOTE — CONSULTS
The [address] [place] [address]    Cardiology Consult/H&P  Consulting Cardiologist Pam King MD, M.D., [facility]  Referring Provider:  TOÑO Ochoa CNP    8/16/2021, 2:35 AM    Chief Complaint   Patient presents with    Cough     pt sent to ED wtih cough, shortness of breath for last 4 days. sent by PCP. Primary Cardiologist:  Asked by Anne Parish MD/TOÑO Meléndez CNP to evaluate and assess this patient's STEMI    History of Present Illness:   Codie Blue is a 80 y.o. female admitted several days ago with respiratory therapy interstitial lung disease and is now requiring high flow oxygen to maintain decent saturations. She has not required intubation. Interestingly she is having a history of atrial fibrillation although I do not see EKG of A. fib. Started having pain this afternoon tonight and EKG showed evidence for an anterior wall infarct. She had been made DNR [facility] and for purposes that status was rescinded and the family wanted full measures of care. For that reason she is going to the cardiac Cath Lab for treatment of her acute anterior wall infarct. Anterior wall STEMI 8/16/2021  History of atrial fibrillation on amiodarone and Eliquis  History of TIA  Hypertension  Currently with severe acute respiratory failure requiring high flow oxygen. Past Medical History:   has a past medical history of Arthritis, Atrial fibrillation, transient ([address] and Transient ischemic attack (TIA). Surgical History:   has a past surgical history that includes Cholecystectomy; joint replacement (05/2018); and Abdomen surgery. Social History:   reports that she has never smoked. She has never used smokeless tobacco. She reports previous alcohol use. She reports previous drug use. Family History:  family history includes Diabetes in her father and mother; Stroke in her mother.      Home Medications:  Prior to Admission medications    Medication Sig TOÑO Mireles CNP   5 mg at 08/15/21 2035    ciprofloxacin (CILOXAN) 0.3 % ophthalmic solution 1 drop  1 drop Both Eyes 2 times per day Linzy Kocher, MD   1 drop at 08/15/21 0835    dilTIAZem (CARDIZEM CD) extended release capsule 120 mg  120 mg Oral Daily TOÑO Bustos CNP   120 mg at 08/15/21 0835    [Held by provider] lisinopril (PRINIVIL;ZESTRIL) tablet 40 mg  40 mg Oral Daily TOÑO Bustos CNP        sodium chloride flush 0.9 % injection 5-40 mL  5-40 mL Intravenous 2 times per day TOÑO Mireles CNP   10 mL at 08/16/21 0015    sodium chloride flush 0.9 % injection 5-40 mL  5-40 mL Intravenous PRN TOÑO Bustos CNP        0.9 % sodium chloride infusion  25 mL Intravenous PRN TOÑO Mireles CNP   Paused at 08/15/21 1821    acetaminophen (TYLENOL) tablet 650 mg  650 mg Oral Q6H PRN TOÑO Mireles CNP   650 mg at 08/15/21 2034    Or    acetaminophen (TYLENOL) suppository 650 mg  650 mg Rectal Q6H PRN TOÑO Bustos CNP        benzonatate (TESSALON) capsule 100 mg  100 mg Oral TID PRN TOÑO Mireles - CNP   100 mg at 08/15/21 0835    ipratropium-albuterol (DUONEB) nebulizer solution 1 ampule  1 ampule Inhalation Q6H PRN TOÑO Bustos CNP   1 ampule at 08/13/21 0908       Allergies:  Sulfa antibiotics     Review of Systems:   · Constitutional: there has been no unanticipated weight loss. · Eyes: No visual changes or diplopia. No scleral icterus. · ENT: No Headaches, hearing loss or vertigo. No mouth sores or sore throat. · Cardiovascular: Reviewed in HPI  ·  Pulmonary:  no cough or sputum production. · Gastrointestinal: No abdominal pain, appetite loss, blood in stools. No change in bowel or bladder habits. · Genitourinary: No dysuria, trouble voiding, or hematuria. · Musculoskeletal:  No gait disturbance, weakness or joint complaints. · Integumentary: No rash or pruritis. LYMPHSABS 0.5 08/15/2021    EOSABS 0.0 08/15/2021    BASOSABS 0.0 08/15/2021    DIFFTYPE Auto 05/17/2011     BMP:    Lab Results   Component Value Date     08/15/2021    K 3.5 08/15/2021    CL 97 08/15/2021    CO2 28 08/15/2021    BUN 43 08/15/2021    LABALBU 2.6 08/13/2021    CREATININE 1.3 08/15/2021    CALCIUM 8.7 08/15/2021    GFRAA 47 08/15/2021    GFRAA >60 05/18/2011    LABGLOM 39 08/15/2021     Uric Acid:  No components found for: URIC  PT/INR:    Lab Results   Component Value Date    PROTIME 24.7 08/15/2021    INR 2.12 08/15/2021     Last 3 Troponin:    Lab Results   Component Value Date    TROPONINI 0.06 08/15/2021    TROPONINI 0.01 08/13/2021    TROPONINI 0.01 08/11/2021     FLP:    Lab Results   Component Value Date    TRIG 119 03/14/2019    HDL 48 03/14/2019    HDL 38 05/18/2011    LDLCALC 108 03/14/2019    LABVLDL 24 03/14/2019       EKG: On 8/15/2021 sinus rhythm 75/min. Anterior wall ST segment elevation MI.  echocardiogram Summary 8/14/2021   Left ventricular cavity size is normal.Normal left ventricular wall   thickness. Ejection fraction is visually estimated to be 60-65%. No regional wall motion abnormalities are noted. Grade II diastolic   dysfunction with elevated LV filling pressures. Mildly dilated right ventricle. Right ventricular systolic function is normal.   Mild aortic stenosis with a peak velocity of 2.1m/s. Estimated pulmonary artery systolic pressure is normal at 33mmHg assuming a   right atrial pressure of 3 mmHg. Assessment/ Plan     Patient Active Problem List    Diagnosis Date Noted    Acute respiratory failure with hypoxia ([address] 08/12/2021    Community acquired bacterial pneumonia 08/12/2021    Sepsis ([facility] 08/11/2021    TIA (transient ischemic attack) 03/13/2019   Patient being seen currently for acute anterior wall STEMI. Going to the Cath Lab for assessment and intervention. She is awake and alert.   She is in sinus rhythm and but it also has received Eliquis dose tonight. Was found to have complete occlusion of the LAD. We treated that with balloon angioplasty and stenting and some degree of aspiration thrombectomy. The vessel open very nicely with great flow. There is in the wake of it anterior apical akinetic segments. We will be starting on Brilinta post-cath. Also continue the Eliquis but without aspirin. We will recheck her lipid profiles. The LDL in March 2019 was 108. She is not and has not been on any type of statin. Thanks for allowing me the opportunity  to participate in the evaluation and care of your patients.  Please call if we can assist further [phone]    This chart was likely completed using voice recognition technology and may contain unintended grammatical , phraseology,and/or punctuation errors  Vandana Isidro MD, M.D., [facility] [place]  8/16/20212:35 AM

## 2021-08-16 NOTE — PROGRESS NOTES
Pulmonary Progress Note    Date of Admission: 8/11/2021   LOS: 5 days       CC:  Community-acquired pneumonia    Subjective:  Overnight, the patient was transferred to the floor. She subsequently developed chest pain noted to have STEMI. Taken to the Cath Lab with balloon and then drug-eluting stent placed into the LAD started on Brilinta and Integrilin. Subsequently she developed hemoptysis and desaturation requiring intubation. Continued to have significant hypoxemia worsening shock with a central line placed. Chest x-ray demonstrated right-sided tension pneumothorax on top of pre-existing community-acquired pneumonia. Right-sided chest tube placed    ROS:   Currently intubated    Assessment:     Community-acquired pneumonia  Acute kidney injury  Acute hypoxemic respiratory failure with hypoxia with saturations less than 88% on room air  Right-sided tension pneumothorax  STEMI 8/16 status post balloon and drug-eluting stent LAD  History of atrial fibrillation  Shock    Plan:        Hospital Day: 5     Community-acquired pneumonia, bilateral  *Follow-up procalcitonin. *Cefepime,    ARDS with intubation  *Intubated 8/16 after CODE STATUS changed  *Significant issues with peak pressure hypoxemia. Decreased tidal volume to 4/kg with improved but continued peak pressures. No signs of air trapping. Pneumothorax resolved after chest tube. Worsening opacities. Peak pressure appears to be pneumonia with worsening infiltrate secondary to bland alveolar hemorrhage      Tension pneumothorax, right  *Status post right-sided chest tube. Bedside review of chest x-ray demonstrates inflation of right lung. Air leak detected. Hemoptysis  *Developed hemoptysis after started on Brilinta and Integrilin from LAD stenting after STEMI  *Bronchoscopy completed. Consistent with bland alveolar hemorrhage.       STEMI  *STEMI 8/16  *Taken to the Cath Lab status post balloon and 2.5 x 33 mm Lise drug-eluting stent into the mid distal aspect of the LAD  *Started Brilinta and Integrilin      History of atrial fibrillation  *Amiodarone  *Eliquis, on hold     Code status  * changed to limited          Prophylaxis  - GI -   - DVT - on hold secondary to bleeding  - VAP - peridex  - C. Diff - Lactobacillus  - Nasal Decolonization - Bactroban    Nutrition  - ADULT DIET; Regular  -   Intake/Output Summary (Last 24 hours) at 8/16/2021 0653  Last data filed at 8/16/2021 0600  Gross per 24 hour   Intake 448.74 ml   Output 200 ml   Net 248.74 ml       Mobility       Access  Arterial      PICC          CVC       CVC Triple Lumen 08/16/21 Left Femoral (Active)   $ Central line insertion $ Yes 08/16/21 0454   Continued need for line? Yes 08/16/21 0600   Site Assessment Bleeding 08/16/21 0600   Proximal Lumen Status Infusing;Capped 08/16/21 0600   Medial Lumen Status Infusing;Capped 08/16/21 0600   Distal Lumen Status Infusing;Capped 08/16/21 0600   Dressing Type Transparent; Anti-microbial patch 08/16/21 0600   Dressing Status New drainage 08/16/21 0600   Dressing Intervention New 08/16/21 0600   Dressing Change Due 08/18/21 08/16/21 0600   Number of days: 0                   I spent 45 minutes of critical care time with this patient excluding any procedures. Data:        PHYSICAL EXAM:   Blood pressure (!) 82/46, pulse 79, temperature 98.8 °F (37.1 °C), temperature source Axillary, resp. rate 24, height 5' 3\" (1.6 m), weight 167 lb 15.9 oz (76.2 kg), SpO2 (!) 74 %.'  Body mass index is 29.76 kg/m². Gen: ++ distress. ENT:   Resp:   CV: Regular rate. Regular rhythm. No murmur or rub. No edema. Skin: Warm, dry, normal texture and turgor. No nodule on exposed extremities. M/S: No cyanosis. No clubbing. No joint deformity.   Psych:      Medications:    Scheduled Meds:   aspirin  325 mg Oral Daily    fentaNYL        fentanNYL  25 mcg Intravenous Once    midazolam        cefepime  2,000 mg Intravenous Q12H    furosemide  40 mg Intravenous BID    dexamethasone  10 mg Intravenous Q24H    lactobacillus  1 capsule Oral Daily with breakfast    pantoprazole  40 mg Oral QAM AC    amiodarone  100 mg Oral Daily    apixaban  5 mg Oral BID    ciprofloxacin  1 drop Both Eyes 2 times per day    dilTIAZem  120 mg Oral Daily    [Held by provider] lisinopril  40 mg Oral Daily    sodium chloride flush  5-40 mL Intravenous 2 times per day       Continuous Infusions:   nitroGLYCERIN Stopped (08/16/21 0205)    eptifibatide Stopped (08/16/21 0356)    sodium chloride 5 mL/hr at 08/16/21 0600    propofol Stopped (08/16/21 0553)    norepinephrine 35 mcg/min (08/16/21 0635)    fentaNYL 200 mcg/hr (08/16/21 0543)    midazolam 5 mg/hr (08/16/21 0600)    sodium chloride      vasopressin (Septic Shock) infusion 0.04 Units/min (08/16/21 0651)    sodium chloride Stopped (08/16/21 0236)       PRN Meds:  fentanNYL, sodium chloride, sodium chloride flush, sodium chloride, acetaminophen **OR** acetaminophen, benzonatate, ipratropium-albuterol    Labs reviewed:  CBC:   Recent Labs     08/14/21  0623 08/15/21  0526 08/16/21  0505   WBC 14.6* 18.8* 23.1*   HGB 11.7* 11.8* 9.9*   HCT 34.6* 35.4* 29.5*   MCV 91.8 91.8 92.2    288 233     BMP:   Recent Labs     08/13/21  1511 08/14/21  0623 08/15/21  0526    137 135*   K 3.3* 3.2* 3.5   CL 96* 97* 97*   CO2 24 29 28   BUN 23* 28* 43*   CREATININE 1.6* 1.2 1.3*     LIVER PROFILE:   Recent Labs     08/13/21  1511   AST 20   ALT 14   BILITOT 0.5   ALKPHOS 106     PT/INR:   Recent Labs     08/14/21  0623 08/15/21  0526 08/16/21  0505   PROTIME 25.5* 24.7* 26.7*   INR 2.18* 2.12* 2.28*     APTT:   Recent Labs     08/16/21  0505   APTT 28.0     UA:No results for input(s): NITRITE, COLORU, PHUR, LABCAST, WBCUA, RBCUA, MUCUS, TRICHOMONAS, YEAST, BACTERIA, CLARITYU, SPECGRAV, LEUKOCYTESUR, UROBILINOGEN, BILIRUBINUR, BLOODU, GLUCOSEU, AMORPHOUS in the last 72 hours.     Invalid input(s): KETONESU  No results for input(s): PH, PCO2, PO2 in the last 72 hours. Cx:      Films:  Radiology Review:  Pertinent images / reports were reviewed as a part of this visit. CT Chest w/ contrast: No results found for this or any previous visit. CT Chest w/o contrast: Results for orders placed during the hospital encounter of 08/11/21    CT CHEST WO CONTRAST    Narrative  EXAMINATION:  CT OF THE CHEST WITHOUT CONTRAST 8/13/2021 12:10 pm    TECHNIQUE:  CT of the chest was performed without the administration of intravenous  contrast. Multiplanar reformatted images are provided for review. Dose  modulation, iterative reconstruction, and/or weight based adjustment of the  mA/kV was utilized to reduce the radiation dose to as low as reasonably  achievable. COMPARISON:  Chest x-ray August 11, 2021 and chest CT May 17, 2011    HISTORY:  ORDERING SYSTEM PROVIDED HISTORY: pneumonia, not improving  TECHNOLOGIST PROVIDED HISTORY:  Reason for exam:->pneumonia, not improving  Reason for Exam: pneumonia, not improving  Acuity: Chronic  Type of Exam: Ongoing    FINDINGS:  Mediastinum: Mildly enlarged lymph nodes are present throughout the  mediastinum. No pericardial effusion. No acute findings. Lungs/pleura: Extensive bilateral pulmonary disease is present, increased  over the past 2 days. Ground-glass opacities throughout both lungs more  severe in the dependent posterior lungs. No pneumothorax. There is a  small-moderate dependent left pleural effusion. Trace amount of right  dependent pleural fluid. Areas of consolidation are present amongst the  ground-glass airspace disease with scattered atelectasis. Upper Abdomen: Negative. Soft Tissues/Bones: No acute findings of the chest wall. Mild degenerative  changes of the spine. No acute osseous abnormality. Impression  Large amount of diffuse bilateral pulmonary disease.   Some of this appears to  be due to pulmonary edema, with associated left greater than right pleural  effusions. Superimposed multifocal pneumonia is likely present and may  either be viral/COVID pneumonia or bacterial pneumonia. This has  significantly increased over the past 2 days. CTPA: No results found for this or any previous visit. CXR PA/LAT: Results for orders placed during the hospital encounter of 08/11/21    XR CHEST (2 VW)    Narrative  EXAMINATION:  TWO XRAY VIEWS OF THE CHEST    8/11/2021 7:54 pm    COMPARISON:  05/17/2011    HISTORY:  ORDERING SYSTEM PROVIDED HISTORY: Shortness of breath  TECHNOLOGIST PROVIDED HISTORY:  Reason for exam:->Shortness of breath  Reason for Exam: Shortness of breath    FINDINGS:  The heart is mildly enlarged and more prominent. The pulmonary vessels are  slightly engorged centrally. There are some hazy ground-glass interstitial  opacities throughout both lungs with some mild parenchymal opacity along the  left lung base and right perihilar region. There is mild blunting of the  costophrenic sulci. Impression  Mild cardiomegaly and mild pulmonary edema which is more prominent. Hazy parenchymal opacities scattered in both lungs which could be due to  pulmonary edema and/or associated developing pneumonia. Small bibasilar pleural effusions. RECOMMENDATION:  Mild      CXR portable: Results for orders placed during the hospital encounter of 08/11/21    XR CHEST PORTABLE    Narrative  EXAMINATION:  ONE XRAY VIEW OF THE CHEST    8/16/2021 5:34 am    COMPARISON:  08/15/2021    HISTORY:  ORDERING SYSTEM PROVIDED HISTORY: ETT placement  TECHNOLOGIST PROVIDED HISTORY:  Reason for exam:->ETT placement  Reason for Exam: ett placement    FINDINGS:  A right-sided pneumothorax is now present with depression of the right  hemidiaphragm and leftward shift of the mediastinum. There has been interval  worsening of diffuse bilateral airspace opacities. The heart size is stable.   Endotracheal tube has been placed with the tip 4.5 cm above the

## 2021-08-16 NOTE — PROCEDURES
Varun Botello is a 80 y.o. female patient. 1. Community acquired bilateral lower lobe pneumonia    2. Acute respiratory failure with hypoxia (HCC)    3. [facility] ([facility]    4. Community acquired bacterial pneumonia      Past Medical History:   Diagnosis Date    Arthritis     Atrial fibrillation, transient ([facility]     Transient ischemic attack (TIA)      Blood pressure (!) 82/46, pulse 79, temperature 98.8 °F (37.1 °C), temperature source Axillary, resp. rate 24, height 5' 3\" (1.6 m), weight 167 lb 15.9 oz (76.2 kg), SpO2 (!) 74 %. Central Line    Date/Time: 8/16/2021 7:25 AM  Performed by: Zabrina Groves MD  Authorized by: Zabrina Groves MD   Consent: The procedure was performed in an emergent situation. Required items: required blood products, implants, devices, and special equipment available  Indications: vascular access    Sedation:  Patient sedated: yes  Sedatives: propofol  Analgesia: fentanyl    Preparation: skin prepped with ChloraPrep  Skin prep agent dried: skin prep agent completely dried prior to procedure  Sterile barriers: all five maximum sterile barriers used - cap, mask, sterile gown, sterile gloves, and large sterile sheet  Hand hygiene: hand hygiene performed prior to central venous catheter insertion  Location details: left femoral  Patient position: flat  Catheter type: triple lumen  Ultrasound guidance: yes (Collapsible vein identified inferior and medially to the pulsatile artery.   Guidewire visualized intraluminal of the vein)  Sterile ultrasound techniques: sterile gel and sterile probe covers were used  Number of attempts: 1  Successful placement: yes  Post-procedure: line sutured and dressing applied  Assessment: blood return through all ports and free fluid flow  Complications: hemorrhage  Patient tolerance: patient tolerated the procedure well with no immediate complications  Comments: ebl ~50cc          Zabrina Groves MD  8/16/2021

## 2021-08-17 LAB
ASPERGILLUS GALACTO AG: NEGATIVE
ASPERGILLUS GALACTO INDEX: 0.04

## 2021-08-18 LAB
P JIROVECII BY PCR: NOT DETECTED
P JIROVECII SOURCE: NORMAL

## 2021-08-19 LAB
ALLERGEN BEEF: <0.1 KU/L
ALLERGEN PHOMA BETAE: <0.1 KU/L
ALLERGEN PORK: <0.1 KU/L
ALLERGEN SEE NOTE: NORMAL
ALLERGEN, FEATHER MIX: NEGATIVE KU/L
ASPERGILLUS ANTIBODY ID: NORMAL
ASPERGILLUS FLAVUS ANTIBODIES: NORMAL
ASPERGILLUS FUMIGATUS #1: NORMAL
ASPERGILLUS FUMIGATUS #2: NORMAL
ASPERGILLUS FUMIGATUS #3: NORMAL
ASPERGILLUS FUMIGATUS #6: NORMAL
AUREOBASIDIUM PULLULANS: NORMAL
BLASTOMYCES ANTIBODY ID: NORMAL
COCCIDIOIDES ANTIBODY ID: NORMAL
HISTOPLASMA ABS, ID: NORMAL
MICROPOLYSPORA FAENI: NORMAL
PIGEON SERUM ABS: NORMAL
SACCHAROMONOSPORA VIRIDIS: NORMAL
THERMOACTINOMYCES CANDIDUS AB: NORMAL
THERMOACTINOMYCES VULGARIS #1: NORMAL

## 2021-08-24 LAB — ANCA IFA: NORMAL
